# Patient Record
Sex: MALE | Race: WHITE | Employment: OTHER | URBAN - METROPOLITAN AREA
[De-identification: names, ages, dates, MRNs, and addresses within clinical notes are randomized per-mention and may not be internally consistent; named-entity substitution may affect disease eponyms.]

---

## 2018-08-15 ENCOUNTER — HOSPITAL ENCOUNTER (OUTPATIENT)
Age: 38
Discharge: HOME | End: 2018-08-15
Payer: COMMERCIAL

## 2018-08-15 LAB
ALBUMIN SERPL-MCNC: 4.4 G/DL (ref 3.5–5.2)
ALP BLD-CCNC: 63 U/L (ref 40–129)
ALT SERPL-CCNC: 13 U/L (ref 0–40)
ANION GAP SERPL CALCULATED.3IONS-SCNC: 9 MMOL/L (ref 7–16)
AST SERPL-CCNC: 14 U/L (ref 0–39)
BILIRUB SERPL-MCNC: 0.5 MG/DL (ref 0–1.2)
BUN BLDV-MCNC: 23 MG/DL (ref 6–20)
CALCIUM SERPL-MCNC: 9.3 MG/DL (ref 8.6–10.2)
CHLORIDE BLD-SCNC: 103 MMOL/L (ref 98–107)
CHOLESTEROL, FASTING: 185 MG/DL (ref 0–199)
CO2: 29 MMOL/L (ref 22–29)
CREAT SERPL-MCNC: 1 MG/DL (ref 0.7–1.2)
GFR AFRICAN AMERICAN: >60
GFR NON-AFRICAN AMERICAN: >60 ML/MIN/1.73
GLUCOSE FASTING: 92 MG/DL (ref 74–109)
HBA1C MFR BLD: 4.9 % (ref 4–5.6)
HCT VFR BLD CALC: 45.6 % (ref 37–54)
HDLC SERPL-MCNC: 57 MG/DL
HEMOGLOBIN: 15.4 G/DL (ref 12.5–16.5)
LDL CHOLESTEROL CALCULATED: 106 MG/DL (ref 0–99)
MCH RBC QN AUTO: 29.5 PG (ref 26–35)
MCHC RBC AUTO-ENTMCNC: 33.8 % (ref 32–34.5)
MCV RBC AUTO: 87.4 FL (ref 80–99.9)
PDW BLD-RTO: 12.2 FL (ref 11.5–15)
PLATELET # BLD: 138 E9/L (ref 130–450)
PMV BLD AUTO: 11 FL (ref 7–12)
POTASSIUM SERPL-SCNC: 5.2 MMOL/L (ref 3.5–5)
RBC # BLD: 5.22 E12/L (ref 3.8–5.8)
SODIUM BLD-SCNC: 141 MMOL/L (ref 132–146)
TOTAL PROTEIN: 6.9 G/DL (ref 6.4–8.3)
TRIGLYCERIDE, FASTING: 110 MG/DL (ref 0–149)
TSH SERPL DL<=0.05 MIU/L-ACNC: 1.64 UIU/ML (ref 0.27–4.2)
VITAMIN D 25-HYDROXY: 35 NG/ML (ref 30–100)
VLDLC SERPL CALC-MCNC: 22 MG/DL
WBC # BLD: 4.8 E9/L (ref 4.5–11.5)

## 2018-08-15 PROCEDURE — 83036 HEMOGLOBIN GLYCOSYLATED A1C: CPT

## 2018-08-15 PROCEDURE — 84443 ASSAY THYROID STIM HORMONE: CPT

## 2018-08-15 PROCEDURE — 80053 COMPREHEN METABOLIC PANEL: CPT

## 2018-08-15 PROCEDURE — 82306 VITAMIN D 25 HYDROXY: CPT

## 2018-08-15 PROCEDURE — 80061 LIPID PANEL: CPT

## 2018-08-15 PROCEDURE — 36415 COLL VENOUS BLD VENIPUNCTURE: CPT

## 2018-08-15 PROCEDURE — 85027 COMPLETE CBC AUTOMATED: CPT

## 2021-11-18 RX ORDER — SERTRALINE HYDROCHLORIDE 100 MG/1
150 TABLET, FILM COATED ORAL DAILY
COMMUNITY

## 2021-11-18 RX ORDER — MELOXICAM 15 MG/1
15 TABLET ORAL DAILY
COMMUNITY
End: 2022-01-03

## 2021-11-18 RX ORDER — BUPROPION HYDROCHLORIDE 200 MG/1
200 TABLET, EXTENDED RELEASE ORAL DAILY
COMMUNITY

## 2021-11-18 RX ORDER — GABAPENTIN 800 MG/1
800 TABLET ORAL 3 TIMES DAILY
COMMUNITY
End: 2022-01-03

## 2021-12-02 ENCOUNTER — TELEPHONE (OUTPATIENT)
Dept: PRIMARY CARE CLINIC | Age: 41
End: 2021-12-02

## 2021-12-02 NOTE — TELEPHONE ENCOUNTER
Called primary number for patient to cancel/ reschedule appt today due to Dr. Salma Guadarrama not being in the office. Patient's Dad answered and said he will have patient call back.

## 2021-12-07 ENCOUNTER — OFFICE VISIT (OUTPATIENT)
Dept: PRIMARY CARE CLINIC | Age: 41
End: 2021-12-07
Payer: COMMERCIAL

## 2021-12-07 VITALS
HEART RATE: 90 BPM | WEIGHT: 262.5 LBS | DIASTOLIC BLOOD PRESSURE: 88 MMHG | OXYGEN SATURATION: 97 % | HEIGHT: 75 IN | BODY MASS INDEX: 32.64 KG/M2 | TEMPERATURE: 97.3 F | SYSTOLIC BLOOD PRESSURE: 132 MMHG

## 2021-12-07 DIAGNOSIS — M76.52 PATELLAR TENDONITIS OF LEFT KNEE: ICD-10-CM

## 2021-12-07 DIAGNOSIS — M76.51 PATELLAR TENDONITIS OF RIGHT KNEE: Primary | ICD-10-CM

## 2021-12-07 DIAGNOSIS — E66.09 CLASS 1 OBESITY DUE TO EXCESS CALORIES WITHOUT SERIOUS COMORBIDITY WITH BODY MASS INDEX (BMI) OF 32.0 TO 32.9 IN ADULT: ICD-10-CM

## 2021-12-07 PROCEDURE — 99213 OFFICE O/P EST LOW 20 MIN: CPT | Performed by: FAMILY MEDICINE

## 2021-12-07 SDOH — ECONOMIC STABILITY: FOOD INSECURITY: WITHIN THE PAST 12 MONTHS, THE FOOD YOU BOUGHT JUST DIDN'T LAST AND YOU DIDN'T HAVE MONEY TO GET MORE.: NEVER TRUE

## 2021-12-07 SDOH — ECONOMIC STABILITY: FOOD INSECURITY: WITHIN THE PAST 12 MONTHS, YOU WORRIED THAT YOUR FOOD WOULD RUN OUT BEFORE YOU GOT MONEY TO BUY MORE.: NEVER TRUE

## 2021-12-07 ASSESSMENT — SOCIAL DETERMINANTS OF HEALTH (SDOH): HOW HARD IS IT FOR YOU TO PAY FOR THE VERY BASICS LIKE FOOD, HOUSING, MEDICAL CARE, AND HEATING?: SOMEWHAT HARD

## 2021-12-07 ASSESSMENT — PATIENT HEALTH QUESTIONNAIRE - PHQ9
1. LITTLE INTEREST OR PLEASURE IN DOING THINGS: 1
2. FEELING DOWN, DEPRESSED OR HOPELESS: 1
SUM OF ALL RESPONSES TO PHQ QUESTIONS 1-9: 2
SUM OF ALL RESPONSES TO PHQ9 QUESTIONS 1 & 2: 2
SUM OF ALL RESPONSES TO PHQ QUESTIONS 1-9: 2
SUM OF ALL RESPONSES TO PHQ QUESTIONS 1-9: 2

## 2021-12-07 NOTE — PROGRESS NOTES
Subjective:  37 y.o. male who presents to the office today with chief complaint:  Chief Complaint   Patient presents with    Knee Pain     Bilateral knee pain, states that the z pack did help when first started taking then pain came back by 75%. Bilateral knee pain: There has been no change in his knee pain. Discussion held with the patient regarding referral to orthopedics. Patient agrees. Health Maintenance Due   Topic Date Due    Hepatitis C screen  Never done    Varicella vaccine (1 of 2 - 2-dose childhood series) Never done    HIV screen  Never done    DTaP/Tdap/Td vaccine (2 - Tdap) 01/02/2000       Review of Systems    Review of Systems: All bolded are positive, all others are negative. General:  Fever, chills, diaphoresis, fatigue, malaise, night sweats, weight loss  Psychological:  Anxiety, disorientation, hallucinations. ENT:  Epistaxis, headaches, vertigo, visual changes. Cardiovascular:  Chest pain, irregular heartbeats, palpitations, paroxysmal nocturnal dyspnea. Respiratory:  Shortness of breath, coughing, sputum production, hemoptysis, wheezing, orthopnea. Gastrointestinal:  Nausea, vomiting, diarrhea, heartburn, constipation, abdominal pain, hematemesis, hematochezia, melena, acholic stools  Genito-Urinary:  Dysuria, urgency, frequency, hematuria  Musculoskeletal:  Joint pain, joint stiffness, joint swelling, muscle pain  Neurology:  Headache, focal neurological deficits, weakness, numbness, paresthesia  Derm:  Rashes, ulcers, excoriations, bruising  Extremities:  Decreased ROM, peripheral edema, mottling      Objective:  Vitals:    12/07/21 1504   BP: 132/88   Pulse: 90   Temp: 97.3 °F (36.3 °C)   SpO2: 97%     Physical Exam  Constitutional:       General: He is not in acute distress. Appearance: He is well-developed. He is not diaphoretic. HENT:      Head: Normocephalic and atraumatic.       Right Ear: External ear normal.      Left Ear: External ear normal.      Nose: Nose normal.      Mouth/Throat:      Pharynx: No oropharyngeal exudate. Eyes:      General:         Right eye: No discharge. Left eye: No discharge. Conjunctiva/sclera: Conjunctivae normal.      Pupils: Pupils are equal, round, and reactive to light. Cardiovascular:      Rate and Rhythm: Normal rate and regular rhythm. Heart sounds: Normal heart sounds. No murmur heard. No friction rub. No gallop. Pulmonary:      Effort: Pulmonary effort is normal. No respiratory distress. Breath sounds: Normal breath sounds. No wheezing or rales. Abdominal:      General: Bowel sounds are normal. There is no distension. Palpations: Abdomen is soft. Tenderness: There is no abdominal tenderness. There is no guarding or rebound. Musculoskeletal:      Cervical back: Normal range of motion and neck supple. Comments: Range of motion of bilateral knees is full. There is some lateral tracking noted of the patella. Tenderness to palpate anterior knee. Lymphadenopathy:      Cervical: No cervical adenopathy. Neurological:      Mental Status: He is alert and oriented to person, place, and time. Psychiatric:         Behavior: Behavior normal.         Thought Content:  Thought content normal.         Judgment: Judgment normal.           Results for orders placed or performed during the hospital encounter of 08/15/18   CBC   Result Value Ref Range    WBC 4.8 4.5 - 11.5 E9/L    RBC 5.22 3.80 - 5.80 E12/L    Hemoglobin 15.4 12.5 - 16.5 g/dL    Hematocrit 45.6 37.0 - 54.0 %    MCV 87.4 80.0 - 99.9 fL    MCH 29.5 26.0 - 35.0 pg    MCHC 33.8 32.0 - 34.5 %    RDW 12.2 11.5 - 15.0 fL    Platelets 573 111 - 047 E9/L    MPV 11.0 7.0 - 12.0 fL   Comprehensive Metabolic Panel, Fasting   Result Value Ref Range    Sodium 141 132 - 146 mmol/L    Potassium 5.2 (H) 3.5 - 5.0 mmol/L    Chloride 103 98 - 107 mmol/L    CO2 29 22 - 29 mmol/L    Anion Gap 9 7 - 16 mmol/L    Glucose, Fasting 92 74 - 109 mg/dL    BUN 23 (H) 6 - 20 mg/dL    CREATININE 1.0 0.7 - 1.2 mg/dL    GFR Non-African American >60 >=60 mL/min/1.73    GFR African American >60     Calcium 9.3 8.6 - 10.2 mg/dL    Total Protein 6.9 6.4 - 8.3 g/dL    Albumin 4.4 3.5 - 5.2 g/dL    Total Bilirubin 0.5 0.0 - 1.2 mg/dL    Alkaline Phosphatase 63 40 - 129 U/L    ALT 13 0 - 40 U/L    AST 14 0 - 39 U/L   Lipid, Fasting   Result Value Ref Range    Cholesterol, Fasting 185 0 - 199 mg/dL    Triglyceride, Fasting 110 0 - 149 mg/dL    HDL 57 >40 mg/dL    LDL Calculated 106 (H) 0 - 99 mg/dL    VLDL Cholesterol Calculated 22 mg/dL   TSH without Reflex   Result Value Ref Range    TSH 1.640 0.270 - 4.200 uIU/mL   Hemoglobin A1C   Result Value Ref Range    Hemoglobin A1C 4.9 4.0 - 5.6 %   Vitamin D 25 Hydrox, D2 & D3   Result Value Ref Range    Vit D, 25-Hydroxy 35 30 - 100 ng/mL         Assessment and Plan:  Suzy Santillan was seen today for knee pain. Diagnoses and all orders for this visit:    Patellar tendonitis of right knee  -     XR KNEE RIGHT (3 VIEWS); Future  -     Radha Mota 15, [facility]    Patellar tendonitis of left knee  -     XR KNEE RIGHT (3 VIEWS); Future  -     XR KNEE LEFT (3 VIEWS); Future  -     Radha Mota 15, [facility]    Class 1 obesity due to excess calories without serious comorbidity with body mass index (BMI) of 32.0 to 32.9 in adult  -     Comprehensive Metabolic Panel; Future  -     CBC; Future  -     TSH without Reflex; Future  -     Lipid Panel; Future        1. Patellar tendinitis: Referral sent to Dr. Nisreen Fuentes. X-rays were completed. Return in about 1 year (around 12/7/2022) for chronic disease management. Patient may come in sooner if needed for medical concerns. Patient advised to call at any time to cancel, re-schedule, or for any questions/concerns.             Fannie Camacho DO    12/7/21  9:51 AM

## 2021-12-10 ENCOUNTER — HOSPITAL ENCOUNTER (OUTPATIENT)
Dept: GENERAL RADIOLOGY | Age: 41
Discharge: HOME | End: 2021-12-12
Payer: COMMERCIAL

## 2021-12-10 ENCOUNTER — HOSPITAL ENCOUNTER (OUTPATIENT)
Age: 41
Discharge: HOME | End: 2021-12-12
Payer: COMMERCIAL

## 2021-12-10 ENCOUNTER — HOSPITAL ENCOUNTER (OUTPATIENT)
Age: 41
Discharge: HOME | End: 2021-12-10
Payer: COMMERCIAL

## 2021-12-10 DIAGNOSIS — M76.52 PATELLAR TENDONITIS OF LEFT KNEE: ICD-10-CM

## 2021-12-10 DIAGNOSIS — M76.51 PATELLAR TENDONITIS OF RIGHT KNEE: ICD-10-CM

## 2021-12-10 LAB
ALBUMIN SERPL-MCNC: 4.5 G/DL (ref 3.5–5.2)
ALP BLD-CCNC: 69 U/L (ref 40–129)
ALT SERPL-CCNC: 19 U/L (ref 0–40)
ANION GAP SERPL CALCULATED.3IONS-SCNC: 11 MMOL/L (ref 7–16)
AST SERPL-CCNC: 17 U/L (ref 0–39)
BILIRUB SERPL-MCNC: 0.4 MG/DL (ref 0–1.2)
BUN BLDV-MCNC: 13 MG/DL (ref 6–20)
CALCIUM SERPL-MCNC: 9 MG/DL (ref 8.6–10.2)
CHLORIDE BLD-SCNC: 104 MMOL/L (ref 98–107)
CHOLESTEROL, TOTAL: 186 MG/DL (ref 0–199)
CO2: 24 MMOL/L (ref 22–29)
CREAT SERPL-MCNC: 0.9 MG/DL (ref 0.7–1.2)
GFR AFRICAN AMERICAN: >60
GFR NON-AFRICAN AMERICAN: >60 ML/MIN/1.73
GLUCOSE BLD-MCNC: 88 MG/DL (ref 74–99)
HCT VFR BLD CALC: 45.4 % (ref 37–54)
HDLC SERPL-MCNC: 61 MG/DL
HEMOGLOBIN: 15.3 G/DL (ref 12.5–16.5)
LDL CHOLESTEROL CALCULATED: 110 MG/DL (ref 0–99)
MCH RBC QN AUTO: 29.7 PG (ref 26–35)
MCHC RBC AUTO-ENTMCNC: 33.7 % (ref 32–34.5)
MCV RBC AUTO: 88.2 FL (ref 80–99.9)
PDW BLD-RTO: 12.4 FL (ref 11.5–15)
PLATELET # BLD: 152 E9/L (ref 130–450)
PMV BLD AUTO: 10.8 FL (ref 7–12)
POTASSIUM SERPL-SCNC: 4.5 MMOL/L (ref 3.5–5)
RBC # BLD: 5.15 E12/L (ref 3.8–5.8)
SODIUM BLD-SCNC: 139 MMOL/L (ref 132–146)
TOTAL PROTEIN: 6.7 G/DL (ref 6.4–8.3)
TRIGL SERPL-MCNC: 76 MG/DL (ref 0–149)
TSH SERPL DL<=0.05 MIU/L-ACNC: 1.4 UIU/ML (ref 0.27–4.2)
VLDLC SERPL CALC-MCNC: 15 MG/DL
WBC # BLD: 4.3 E9/L (ref 4.5–11.5)

## 2021-12-10 PROCEDURE — 84443 ASSAY THYROID STIM HORMONE: CPT

## 2021-12-10 PROCEDURE — 80053 COMPREHEN METABOLIC PANEL: CPT

## 2021-12-10 PROCEDURE — 73562 X-RAY EXAM OF KNEE 3: CPT

## 2021-12-10 PROCEDURE — 80061 LIPID PANEL: CPT

## 2021-12-10 PROCEDURE — 36415 COLL VENOUS BLD VENIPUNCTURE: CPT

## 2021-12-10 PROCEDURE — 85027 COMPLETE CBC AUTOMATED: CPT

## 2022-01-03 ENCOUNTER — OFFICE VISIT (OUTPATIENT)
Dept: ORTHOPEDIC SURGERY | Age: 42
End: 2022-01-03
Payer: COMMERCIAL

## 2022-01-03 VITALS — TEMPERATURE: 98 F | HEIGHT: 75 IN | BODY MASS INDEX: 31.71 KG/M2 | WEIGHT: 255 LBS

## 2022-01-03 DIAGNOSIS — M17.11 PRIMARY OSTEOARTHRITIS OF RIGHT KNEE: Primary | ICD-10-CM

## 2022-01-03 DIAGNOSIS — M17.12 PRIMARY OSTEOARTHRITIS OF LEFT KNEE: ICD-10-CM

## 2022-01-03 PROCEDURE — 99204 OFFICE O/P NEW MOD 45 MIN: CPT | Performed by: ORTHOPAEDIC SURGERY

## 2022-01-03 RX ORDER — GABAPENTIN 400 MG/1
CAPSULE ORAL
COMMUNITY
Start: 2021-12-21

## 2022-01-03 RX ORDER — CELECOXIB 200 MG/1
200 CAPSULE ORAL DAILY
Qty: 30 CAPSULE | Refills: 3 | Status: SHIPPED
Start: 2022-01-03 | End: 2022-05-24 | Stop reason: SDUPTHER

## 2022-01-03 RX ORDER — METHYLPREDNISOLONE 4 MG/1
4 TABLET ORAL SEE ADMIN INSTRUCTIONS
Qty: 1 KIT | Refills: 0 | Status: SHIPPED | OUTPATIENT
Start: 2022-01-03 | End: 2022-01-09

## 2022-01-03 NOTE — PROGRESS NOTES
Chief Complaint   Patient presents with    Knee Pain     b/l knee osteoarthritis, pain started 7/2021       Subjective:     Patient ID: Omayra Blake is a 37 y.o..  male    Knee Pain  Patient complains of bilateral knee pain R=L. This is evaluated as a personal injury. There was not a history of injury. He was at amusement park walking around for extended hours   The pain began 6 months ago. The pain is located anterior. He describes  His symptoms as sharp. He has not experienced popping, clicking, locking, and giving way in the affected knee. The patient has had pain with kneeling, squating, and climbing stairs. Symptoms improve with rest. The symptoms are worse with activity, stair climbing. The knee has not given out or felt unstable. The patient can bend and straighten the knee fully. The patient is active in none. Treatment to date has been ice, heat, NSAID's, knee sleeve/brace, without significant relief.      Past Medical History:   Diagnosis Date    Bipolar disorder ([facility] [address]     Colon polyp     Epidermoid cyst     IBS (irritable bowel syndrome)     IBS (irritable bowel syndrome)     Migraine     Migraine     OCD (obsessive compulsive disorder)      Past Surgical History:   Procedure Laterality Date    HERNIA REPAIR Right     X3    KNEE SURGERY Right     Excision Mass Right Knee     TONSILLECTOMY         Current Outpatient Medications:     gabapentin (NEURONTIN) 400 MG capsule, TAKE ONE CAPSULE BY MOUTH FOUR TIMES A DAY, Disp: , Rfl:     sertraline (ZOLOFT) 100 MG tablet, Take 150 mg by mouth daily Take 2 tablets daily, Disp: , Rfl:     buPROPion (WELLBUTRIN SR) 200 MG extended release tablet, Take 200 mg by mouth daily, Disp: , Rfl:   No Known Allergies  Social History     Socioeconomic History    Marital status: Single     Spouse name: Not on file    Number of children: Not on file    Years of education: Not on file    Highest education level: Not on file   Occupational History  Not on file   Tobacco Use    Smoking status: Never Smoker    Smokeless tobacco: Never Used   Substance and Sexual Activity    Alcohol use: Never    Drug use: Never    Sexual activity: Not on file   Other Topics Concern    Not on file   Social History Narrative    Not on file     Social Determinants of Health     Financial Resource Strain: Medium Risk    Difficulty of Paying Living Expenses: Somewhat hard   Food Insecurity: No Food Insecurity    Worried About Running Out of Food in the Last Year: Never true    Debra of Food in the Last Year: Never true   Transportation Needs:     Lack of Transportation (Medical): Not on file    Lack of Transportation (Non-Medical): Not on file   Physical Activity:     Days of Exercise per Week: Not on file    Minutes of Exercise per Session: Not on file   Stress:     Feeling of Stress : Not on file   Social Connections:     Frequency of Communication with Friends and Family: Not on file    Frequency of Social Gatherings with Friends and Family: Not on file    Attends [employer] Services: Not on file    Active Member of [address] [place] [address] [employer] or Organizations: Not on file    Attends Club or Organization Meetings: Not on file    Marital Status: Not on file   Intimate Partner Violence:     Fear of Current or Ex-Partner: Not on file    Emotionally Abused: Not on file    Physically Abused: Not on file    Sexually Abused: Not on file   Housing Stability:     Unable to Pay for Housing in the Last Year: Not on file    Number of [place] in the Last Year: Not on file    Unstable Housing in the Last Year: Not on file     Family History   Problem Relation Age of Onset    High Blood Pressure Mother     Asthma Mother     Asthma Brother          REVIEW OF SYSTEMS:     General/Constitution:  (-)weight loss, (-)fever, (-)chills, (-)weakness. Skin: (-) rash,(-) psoriasis,(-) eczema, (-)skin cancer.    Musculoskeletal: (-) fractures,  (-) dislocations,(-) collagen vascular disease, (-) fibromyalgia, (-) multiple sclerosis, (-) muscular dystrophy, (-) RSD,(-) joint pain (-)swelling, (-) joint pain,swelling. Neurologic: (-) epilepsy, (-)seizures,(-) brain tumor,(-) TIA, (-)stroke, (-)headaches, (-)Parkinson disease,(-) memory loss, (-) LOC. Cardiovascular: (-) Chest pain, (-) swelling in legs/feet, (-) SOB, (-) cramping in legs/feet with walking. Respiratory: (-) SOB, (-) Coughing, (-) night sweats. GI: (-) nausea, (-) vomiting, (-) diarrhea, (-) blood in stool, (-) gastric ulcer. Psychiatric: (-) Depression, (-) Anxiety, (-) bipolar disease, (-) Alzheimer's Disease  Allergic/Immunologic: (-) allergies latex, (-) allergies metal, (-) skin sensitivity. Hematlogic: (-) anemia, (-) blood transfusion, (-) DVT/PE, (-) Clotting disorders    Subjective:    Constitution:  Temp 98 °F (36.7 °C)   Ht 6' 3\" (1.905 m)   Wt 255 lb (115.7 kg)   BMI 31.87 kg/m²     Psycihatric:  The patient is alert and oriented x 3, appears to be stated age and in no distress. Respiratory:  Respiratory effort is not labored. Patient is not gasping. Palpation of the chest reveals no tactile fremitus. Skin:  Upon inspection: the skin appears warm, dry and intact. There is  a previous scar over the affected area. There is any cellulitis, lymphedema or cutaneous lesions noted in the lower extremities. Upon palpation there is no induration noted. Neurologic:  Gait: antalgic; Motor exam of the lower extremities show ; quadriceps, hamstrings, foot dorsi and plantar flexors intact R.  5/5 and L. 5/5. Deep tendon reflexes are 2/4 at the knees and 2/4 at the ankles with strong extensor hallicus longus motor strength bilaterally. Sensory to both feet is intact to all sensory roots, . Cardiovascular: The vascular exam is normal and is well perfused to distal extremities. Distal pulses DP/PT: R. 2+; L. 2+. There is cap refill noted less than two seconds in all digits.  There is not edema of the bilateral lower extremities. There is not varicosities noted in the distal extremities. Lymph:  Upon palpation,  there is no lymphadenopathy noted in bilateral lower extremities. Musculoskeletal:  Gait: antalgic; examination of the nails and digits reveal no cyanosis or clubbing. Lumbar exam:  On visual inspection, there is not deformity of the spine. full range of motion, no tenderness, palpable spasm or pain on motion. Special tests: Straight Leg Raise negative, Elizabeth test negative. Hip exam:   Upon inspection, there is not deformity noted. Upon palpation there is not tenderness. ROM: is  full and symmetrical.   Strength: Hip Flexors 5/5; Hip Abductors 5/5; Hip Adduction 5/5. Knee exam:  Bilateral knee exam shows;  range of motion of R. Knee is 0 to 120, and L. Knee is 0 to 120. The patient does have  pain on motion, effusion is none, there is tenderness over the  anterior region, there are not any masses, there is not ligamentous instability, there is not  deformity noted. Knee exam: neither positive for moderate crepitations, some mild tenderness laxity is not noted with  stress.   There is not a popliteal cyst.    R. Knee:  Lachman's negative, Anterior Drawer negative, Posterior Drawer negative  Wale's negative, Thallasy  negative,   PF grind test negative, Apprehension test negative, Patellar J sign  negative  L. Knee:  Lachman's negative, Anterior Drawer negative, Posterior Drawer negative  Wale's negative, Thallasy  negative,   PF grind test negative, Apprehension test negative,  Patellar J sign  negative    Xray Exam:  Radiographs of the left knee demonstrate no fractures with preserved   alignment.  Minimal medial compartment joint space narrowing and   tricompartmental degenerative spurring.  Osseous mineralization is normal.   There is no large joint effusion.       Radiographs of the right knee demonstrate no fractures with preserved   alignment.  Minimal medial compartment joint space narrowing and   tricompartmental degenerative spurring.  Osseous mineralization is normal.   There is no large joint effusion. Radiographic findings reviewed with patient    Assessment:  Encounter Diagnoses   Name Primary?  Primary osteoarthritis of right knee Yes    Primary osteoarthritis of left knee        Plan:  Natural history and expected course discussed. Questions answered. Educational materials distributed. Rest, ice, compression, and elevation (RICE) therapy. Reduction in offending activity. I had a lengthy discussion with the patient regarding their diagnosis. I explained treatment options including surgical vs non surgical treatment. I reviewed in detail the risks and benefits and outlined the procedure in detail with expected outcomes and possible complications. I also discussed non surgical treatment such as injections (CSI and visco supplementation), physical therapy, topical creams and NSAID's. They have elected for conservative management at this time.    Medrol  celebrex

## 2022-05-24 ENCOUNTER — TELEPHONE (OUTPATIENT)
Dept: ORTHOPEDIC SURGERY | Age: 42
End: 2022-05-24

## 2022-05-24 RX ORDER — CELECOXIB 200 MG/1
200 CAPSULE ORAL DAILY
Qty: 90 CAPSULE | Refills: 0 | Status: SHIPPED
Start: 2022-05-24 | End: 2022-08-26 | Stop reason: SDUPTHER

## 2022-05-24 NOTE — TELEPHONE ENCOUNTER
Patient is requesting a 90 day supply    . Last appointment 1/3/2022  Next appointment No future appointments.    Last refill:  01/03/2022  DOS: Last seen 01/03/2022      Patient called in requesting refill of:    celecoxib (CELEBREX) 200 MG capsule         [id number] - [place], [place] - [address] [facility] [phone] - F [address] [place] [address], [place] [address]

## 2022-08-26 ENCOUNTER — TELEPHONE (OUTPATIENT)
Dept: ORTHOPEDIC SURGERY | Age: 42
End: 2022-08-26

## 2022-08-26 RX ORDER — CELECOXIB 200 MG/1
200 CAPSULE ORAL DAILY
Qty: 90 CAPSULE | Refills: 0 | Status: SHIPPED | OUTPATIENT
Start: 2022-08-26 | End: 2022-11-24

## 2022-08-26 NOTE — TELEPHONE ENCOUNTER
.Last appointment 1/3/2022  Next appointment No future appointments.    Last refill:  05/24/2022  DOS:  last seen 01/03/2022      Patient called in requesting refill of:    celecoxib (CELEBREX) 200 MG capsule       [id number] - [place], [place] - [address] [facility] [phone] - F [address] [place] [address], [place] [address]

## 2022-12-12 ENCOUNTER — TELEPHONE (OUTPATIENT)
Dept: ORTHOPEDIC SURGERY | Age: 42
End: 2022-12-12

## 2022-12-12 RX ORDER — CELECOXIB 200 MG/1
200 CAPSULE ORAL DAILY
Qty: 90 CAPSULE | Refills: 0 | Status: SHIPPED | OUTPATIENT
Start: 2022-12-12 | End: 2023-03-12

## 2022-12-12 NOTE — TELEPHONE ENCOUNTER
.Last appointment 1/3/2022  Next appointment No future appointments.    Last refill:  08/26/2022  DOS:       Patient called in requesting refill of:    celecoxib (CELEBREX) 200 MG capsule      [id number] - [place] , [place] - [address] [facility] [phone] - F [address] [address], [place]  [place] [address]

## 2023-02-14 ENCOUNTER — HOSPITAL ENCOUNTER (EMERGENCY)
Age: 43
Discharge: HOME | End: 2023-02-14
Attending: EMERGENCY MEDICINE
Payer: COMMERCIAL

## 2023-02-14 VITALS
RESPIRATION RATE: 17 BRPM | OXYGEN SATURATION: 95 % | TEMPERATURE: 98 F | DIASTOLIC BLOOD PRESSURE: 91 MMHG | HEIGHT: 75 IN | WEIGHT: 229 LBS | HEART RATE: 79 BPM | BODY MASS INDEX: 28.47 KG/M2 | SYSTOLIC BLOOD PRESSURE: 141 MMHG

## 2023-02-14 DIAGNOSIS — S00.81XA ABRASION OF FACE, INITIAL ENCOUNTER: ICD-10-CM
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
## 2023-02-14 PROCEDURE — 90471 IMMUNIZATION ADMIN: CPT

## 2023-02-14 PROCEDURE — 99284 EMERGENCY DEPT VISIT MOD MDM: CPT

## 2023-02-14 PROCEDURE — 90715 TDAP VACCINE 7 YRS/> IM: CPT

## 2023-02-14 PROCEDURE — 6360000002 HC RX W HCPCS

## 2023-02-14 RX ADMIN — TETANUS TOXOID, REDUCED DIPHTHERIA TOXOID AND ACELLULAR PERTUSSIS VACCINE, ADSORBED 0.5 ML: 5; 2.5; 8; 8; 2.5 SUSPENSION INTRAMUSCULAR at 21:55

## 2023-02-14 ASSESSMENT — ENCOUNTER SYMPTOMS
ABDOMINAL DISTENTION: 0
EYE REDNESS: 0
BLOOD IN STOOL: 0
WHEEZING: 0
SORE THROAT: 0
DIARRHEA: 0
NAUSEA: 0
BACK PAIN: 0
CONSTIPATION: 0
SINUS PRESSURE: 0
VOMITING: 0
ABDOMINAL PAIN: 0
RHINORRHEA: 0
PHOTOPHOBIA: 0
COUGH: 0
SHORTNESS OF BREATH: 0
EYE DISCHARGE: 0

## 2023-02-14 ASSESSMENT — PAIN SCALES - GENERAL: PAINLEVEL_OUTOF10: 6

## 2023-02-14 ASSESSMENT — PAIN DESCRIPTION - LOCATION: LOCATION: NOSE

## 2023-02-14 ASSESSMENT — PAIN - FUNCTIONAL ASSESSMENT: PAIN_FUNCTIONAL_ASSESSMENT: 0-10

## 2023-02-15 NOTE — DISCHARGE INSTRUCTIONS
Please continue taking your home medications as they are prescribed. We recommend that you follow-up with your primary care physician to follow-up on your symptoms and to discuss your recent emergency room visit. Please return to the emergency room if you experience worsening of her symptoms, severe headaches, visual changes, hearing changes, numbness, weakness, tingling, loss of sensation, severe chest pain, severe shortness of breath, or uncontrolled fevers.

## 2023-02-15 NOTE — ED PROVIDER NOTES
[address] [facility]  Department of Emergency Medicine     Written by: Deepika Simpson MD  Patient Name: Lawson Ahuja  Attending Provider: Rajendra Marino DO  Admit Date: 2023  9:15 PM  MRN: [id number]                   : 1980      Chief Complaint   Patient presents with    Fall    Facial Injury     Lac to nose after falling on metal  knick knack/ denies loc     - Chief complaint    HPI   Patient is a 52-year-old male with a past medical history of bipolar disorder, OCD, and irritable bowel syndrome presenting after a fall and facial injury. Patient tripped on a sandal approximately 40 minutes prior to arrival and fell onto some knick knacks onto the a table. Patient states that there are items made of wood, porcelain, and metal.  Patient did not lose consciousness and he is not on any blood thinners. His mother was in the next room. Patient denies smoking history, alcohol use, or drug use. Patient denies headache, lightheadedness, dizziness, numbness, weakness, tingling, loss of sensation, loss of consciousness, fever, cough, sore throat, chest pain, shortness of breath, nausea, vomiting, abdominal pain, hematuria, dysuria, increasing or decreasing urinary frequency, blood in stool, constipation, diarrhea, leg pain, leg swelling, recent travel, recent illness, recent surgery, or sick contacts. Review of Systems   Constitutional:  Negative for activity change, chills, fatigue and fever. HENT:  Negative for congestion, postnasal drip, rhinorrhea, sinus pressure and sore throat. Eyes:  Negative for photophobia, discharge, redness and visual disturbance. Respiratory:  Negative for cough, shortness of breath and wheezing. Cardiovascular:  Negative for chest pain, palpitations and leg swelling. Gastrointestinal:  Negative for abdominal distention, abdominal pain, blood in stool, constipation, diarrhea, nausea and vomiting.    Endocrine: Negative for cold intolerance and heat intolerance. Genitourinary:  Negative for decreased urine volume, difficulty urinating, dysuria, flank pain, frequency, hematuria and urgency. Musculoskeletal:  Negative for arthralgias, back pain, joint swelling and myalgias. Fall, facial injury   Skin:  Negative for rash and wound. Allergic/Immunologic: Negative for immunocompromised state. Neurological:  Negative for dizziness, syncope, facial asymmetry, weakness, light-headedness, numbness and headaches. Hematological:  Does not bruise/bleed easily. Psychiatric/Behavioral:  Negative for behavioral problems and hallucinations. Physical Exam  Constitutional:       General: He is not in acute distress. Appearance: Normal appearance. He is not ill-appearing, toxic-appearing or diaphoretic. HENT:      Head: Normocephalic and atraumatic. No raccoon eyes, Chong's sign, abrasion, contusion, masses, right periorbital erythema, left periorbital erythema or laceration. Hair is normal.      Right Ear: Hearing normal.      Left Ear: Hearing normal.      Nose: Laceration present. No nasal deformity, septal deviation, signs of injury, nasal tenderness, mucosal edema, congestion or rhinorrhea. Right Nostril: No foreign body, epistaxis, septal hematoma or occlusion. Left Nostril: No foreign body, epistaxis, septal hematoma or occlusion. Right Turbinates: Not enlarged, swollen or pale. Left Turbinates: Not enlarged, swollen or pale. Comments: Patient has a 3 cm linear scratch on the left side of his nose that is bleeding mildly. Patient also has a 1 cm laceration underneath his right nares at the lip line that is holding itself close and not actively bleeding. There is dried blood at the nares     Mouth/Throat:      Lips: Pink. No lesions. Mouth: Mucous membranes are moist. No injury, lacerations, oral lesions or angioedema. Tongue: No lesions. Tongue does not deviate from midline.       Palate: No mass and lesions. Pharynx: Oropharynx is clear. Uvula midline. No pharyngeal swelling, oropharyngeal exudate, posterior oropharyngeal erythema or uvula swelling. Eyes:      Extraocular Movements: Extraocular movements intact. Conjunctiva/sclera: Conjunctivae normal.      Pupils: Pupils are equal, round, and reactive to light. Cardiovascular:      Rate and Rhythm: Normal rate and regular rhythm. Pulses: Normal pulses. Radial pulses are 2+ on the right side and 2+ on the left side. Posterior tibial pulses are 2+ on the right side and 2+ on the left side. Heart sounds: S1 normal and S2 normal.   Pulmonary:      Effort: Pulmonary effort is normal. No tachypnea, accessory muscle usage or respiratory distress. Breath sounds: Normal breath sounds and air entry. No decreased breath sounds, wheezing, rhonchi or rales. Abdominal:      General: Abdomen is flat. Bowel sounds are normal. There is no distension. Palpations: Abdomen is soft. There is no fluid wave or mass. Tenderness: There is no abdominal tenderness. There is no right CVA tenderness, left CVA tenderness or guarding. Musculoskeletal:         General: No swelling or tenderness. Normal range of motion. Cervical back: Normal range of motion and neck supple. No rigidity. Right lower leg: No edema. Left lower leg: No edema. Lymphadenopathy:      Cervical: No cervical adenopathy. Skin:     General: Skin is warm and dry. Capillary Refill: Capillary refill takes less than 2 seconds. Findings: No bruising, lesion or rash. Neurological:      General: No focal deficit present. Mental Status: He is alert and oriented to person, place, and time. Mental status is at baseline. Motor: No weakness. Psychiatric:         Attention and Perception: Attention normal.         Mood and Affect: Mood and affect normal.         Behavior: Behavior is cooperative.         Procedures       MDM  Number of Diagnoses or Management Options  Abrasion of face, initial encounter  Fall, initial encounter  Diagnosis management comments: Patient is a 71-year-old male with a past medical history of bipolar disorder, OCD, and irritable bowel syndrome presenting after a fall and facial injury. At the time of initial evaluation, the patient is hypertensive but otherwise hemodynamically stable. Differential diagnosis includes facial abrasion and laceration. Patient's last tetanus booster was greater than 10 years ago. We will administer a new tetanus booster. Patient's injuries do not require repair in the emergency room. Patient is comfortable with outpatient follow-up. At the time of discharge, the patient demonstrated good understanding of his condition, had no questions, and was hemodynamically stable. ED Course as of 02/14/23 2149 Tue Feb 14, 2023 2145 ATTENDING PROVIDER ATTESTATION:     I have personally performed and/or participated in the history, exam, medical decision making, and procedures and agree with all pertinent clinical information unless otherwise noted. I have also reviewed and agree with the past medical, family and social history unless otherwise noted. I have discussed this patient in detail with the resident, and provided the instruction and education regarding . Patient here for a nasal injury. He states that he tripped and fell prior to ER arrival and hit his nose on some little items on a countertop. No blunt injury and no head injury otherwise. No loss of consciousness. No facial pain. Has 2 cuts to his nose on the left and 1 on the right. Bleeding is controlled. No other complaints, no anticoagulants. Unknown last tetanus shot. No eye injury and no other injuries  My findings/plan: Patient is sitting up in the bed resting comfortably no distress.   Heart rate regular, lungs are clear and equal.  Arms and legs are neurovascular intact with no signs of acute bone or joint injury. He has no cervical, thoracic lumbar spine tenderness. He has a superficial linear abrasion to the left nares. He has a laceration under the right ala at the lip edge holding itself closed with no bleeding. There were no septal hematomas and no intranasal injuries. No intraoral injuries. No other sign of acute head or face injury. No hyphemas bilaterally, pupils are equal, round and reactive to light. No periorbital injuries. [NC]      ED Course User Index  [NC] Jadyn Dunnebianka Pettit DO       --------------------------------------------- PAST HISTORY ---------------------------------------------  Past Medical History:  has a past medical history of Bipolar disorder ([facility] [address] Colon polyp, Epidermoid cyst, IBS (irritable bowel syndrome), IBS (irritable bowel syndrome), Migraine, Migraine, and OCD (obsessive compulsive disorder). Past Surgical History:  has a past surgical history that includes hernia repair (Right); Tonsillectomy; and knee surgery (Right). Social History:  reports that he has never smoked. He has never used smokeless tobacco. He reports that he does not drink alcohol and does not use drugs. Family History: family history includes Asthma in his brother and mother; High Blood Pressure in his mother. The patients home medications have been reviewed. Allergies: Patient has no known allergies. -------------------------------------------------- RESULTS -------------------------------------------------  Labs:  No results found for this visit on 02/14/23. Radiology:  No orders to display       ------------------------- NURSING NOTES AND VITALS REVIEWED ---------------------------  Date / Time Roomed:  2/14/2023  9:15 PM  ED Bed Assignment:  21/21    The nursing notes within the ED encounter and vital signs as below have been reviewed.    BP (!) 141/91   Pulse 79   Temp 98 °F (36.7 °C)   Resp 17   Ht 6' 3\" (1.905 m)   Wt 229 lb (103.9 kg)   SpO2 95%   BMI 28.62 kg/m²   Oxygen Saturation Interpretation: Normal      ------------------------------------------ PROGRESS NOTES ------------------------------------------  I have spoken with the patient and discussed todays results, in addition to providing specific details for the plan of care and counseling regarding the diagnosis and prognosis. Their questions are answered at this time and they are agreeable with the plan. I discussed at length with them reasons for immediate return here for re evaluation. They will followup with primary care by calling their office tomorrow. --------------------------------- ADDITIONAL PROVIDER NOTES ---------------------------------  At this time the patient is without objective evidence of an acute process requiring hospitalization or inpatient management. They have remained hemodynamically stable throughout their entire ED visit and are stable for discharge with outpatient follow-up. The plan has been discussed in detail and they are aware of the specific conditions for emergent return, as well as the importance of follow-up. New Prescriptions    No medications on file       Diagnosis:  1. Fall, initial encounter    2. Abrasion of face, initial encounter        Disposition:  Patient's disposition: Discharge to home  Patient's condition is stable. Patient was seen and evaluated by myself and my attending Elizabeth Ibarra DO. Assessment and Plan discussed with attending provider, please see attestation for final plan of care.      MD Yuriy Buchanan MD  Resident  02/14/23 [id number]

## 2023-04-19 ENCOUNTER — EVALUATION (OUTPATIENT)
Dept: PHYSICAL THERAPY | Age: 43
End: 2023-04-19

## 2023-04-19 DIAGNOSIS — M17.0 PRIMARY OSTEOARTHRITIS OF BOTH KNEES: Primary | ICD-10-CM

## 2023-04-19 NOTE — PROGRESS NOTES
Physical Therapy Daily Treatment Note    Date: 2023  Patient Name: Gideon Waldrop  : 1980   MRN: [id number]  [facility]:  insidious   DOSx: -  Referring Provider: Johnson Chandler PA-C  [address] [place], [place] [address]Abad Flores     Medical Diagnosis:     M17.0 (ICD-10-CM) - Bilateral primary osteoarthritis of knee      X = TO BE PERFORMED NEXT VISIT  > = PROGRESS TO THIS    S: See eval  O: Discussed anatomy, physiology, body mechanics, principles of loading, and progressive loading/activity. Time 8021-8305     Visit 1 Repeat outcome measure at mid point and end. Pain Pain at rest 0/10     ROM      Modalities         MO   Manual            Stretch      Towel / strap DF, INV, EV   TE      TE   Exercise      Ball / can rolling   TE   Toe curls      Heel slides   TE   QS   TE   SLR X(w/ 3 sec hold)  TE   SAQ   TE   [x] TG  [] Leg Press 2-leg x  TE   [] TG  [] Leg Press 1-leg   TE   Hamstring Curl Machine   TE   Knee Extension Machine   TE   Step-ups - FWD   TA   Step-ups - LAT x  TA   Step-ups - BWD   TA   Heel touches x  TA      TA      TA               A:  Tolerated well.     P: Continue with rehab plan  Natalya Moy PT    Treatment Charges: Mins Units   Initial Evaluation 45 1   Re-Evaluation     Ther Exercise         TE     Manual Therapy     MT     Ther Activities        TA     Gait Training          GT     Neuro Re-education NR     Modalities     Non-Billable Service Time     Other     Total Time/Units 45 1

## 2023-04-19 NOTE — PROGRESS NOTES
[address] [facility] OUTPATIENT REHABILITATION  PHYSICAL THERAPY INITIAL EVALUATION         Date:  2023   Patient: Darlin Dietz  : 1980  MRN: [id number]  Referring Provider: Juan Lopez PA-C  [address] [place], [place] [address]Daylin April Jayesh Meder[address]elina [address]     Medical Diagnosis:     M17.0 (ICD-10-CM) - Bilateral primary osteoarthritis of knee    Physician Order: Eval and Treat      SUBJECTIVE:     Onset date:     Onset: Insidious      Interventions for current problem:  celebryx    Chief complaint: pain walking dog or coming down stairs    Behavior: condition is staying the same    Pain:   Current: denies/10     Best: denies/10     Worst:5/10   Pain frequency: intermittent    Symptom Type / Quality: aching  Location[de-identified] Knee: anterior    Aggravated by: walking, standing, pivoting    Relieved by: rest, reduced weightbearing    Imaging results: No results found. Past Medical History  Past Medical History:   Diagnosis Date    Bipolar disorder (HCC)     Colon polyp     Epidermoid cyst     IBS (irritable bowel syndrome)     IBS (irritable bowel syndrome)     Migraine     Migraine     OCD (obsessive compulsive disorder)      Past Surgical History:   Procedure Laterality Date    HERNIA REPAIR Right     X3    KNEE SURGERY Right     Excision Mass Right Knee     TONSILLECTOMY         Medications:   Current Outpatient Medications   Medication Sig Dispense Refill    celecoxib (CELEBREX) 200 MG capsule Take 1 capsule by mouth daily 30 capsule 3    gabapentin (NEURONTIN) 400 MG capsule TAKE ONE CAPSULE BY MOUTH FOUR TIMES A DAY      sertraline (ZOLOFT) 100 MG tablet Take 1 tablet by mouth daily Take 2 tablets daily      buPROPion (WELLBUTRIN SR) 200 MG extended release tablet Take 1 tablet by mouth daily       No current facility-administered medications for this visit. 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 Status: part time.     Exercise regimen: none    Hobbies:

## 2023-04-20 PROBLEM — M17.0 PRIMARY OSTEOARTHRITIS OF BOTH KNEES: Status: ACTIVE | Noted: 2023-04-20

## 2023-04-24 ENCOUNTER — TREATMENT (OUTPATIENT)
Dept: PHYSICAL THERAPY | Age: 43
End: 2023-04-24
Payer: COMMERCIAL

## 2023-04-24 DIAGNOSIS — M79.671 RIGHT FOOT PAIN: Primary | ICD-10-CM

## 2023-04-24 DIAGNOSIS — M17.0 PRIMARY OSTEOARTHRITIS OF BOTH KNEES: Primary | ICD-10-CM

## 2023-04-24 PROCEDURE — 97530 THERAPEUTIC ACTIVITIES: CPT

## 2023-04-24 PROCEDURE — 97110 THERAPEUTIC EXERCISES: CPT

## 2023-04-24 NOTE — PROGRESS NOTES
Physical Therapy Daily Treatment Note    Date: 2023  Patient Name: Joshua Salgado  : 1980   MRN: [id number]  [facility]:  insidious   DOSx: -    Referring Provider:   Lev Almanza PA-C  [address] [facility] Center[facility]lin BadilloAmber GeoffAbad [address]arg[address]       Medical Diagnosis:   M17.0 (ICD-10-CM) - Bilateral primary osteoarthritis of knee    Chip Fernandez has anterior knee pain related to his extensor mechanism as evidenced by his pain resisting his dog pulling, descending stairs, and squatting. We will start a loading program of SLR progressing to knee extension exercises. X = TO BE PERFORMED NEXT VISIT  > = PROGRESS TO THIS    S: Patient reports he has the most difficulty with anything that causes \"strong impact\". O: Discussed anatomy, physiology, body mechanics, principles of loading, and progressive loading/activity. Time 6015-8037     Visit 2 Repeat outcome measure at mid point and end. Pain Pain at rest 0/10     ROM      Modalities         MO   Manual            Stretch      Towel / strap DF, INV, EV   TE      TE   Exercise      Nustep L5 x 10 min   TA   Ball / can rolling   TE   Toe curls      Heel slides   TE   QS   TE   SLR 3 x 10, 3 sec hold each  TE   SAQ   TE   LAQ 3 x 10, 3-5 sec hold each     [x] TG  [] Leg Press 2-leg x  TE   [] TG  [] Leg Press 1-leg   TE   Hamstring Curl Machine   TE   Knee Extension Machine   TE   Step-ups - FWD   TA   Step-ups - LAT x  TA   Step-ups - BWD   TA   Heel touches x  TA      TA      TA               A:Tolerated well. Large, functional, dynamic, global movements used to build strength, balance, endurance, and flexibility and to improve physical performance. Provided HEP.   P: Continue with rehab plan  Jillian Expose, PTA    Treatment Charges: Mins Units   Initial Evaluation     Re-Evaluation     Ther Exercise         TE 15 1   Manual Therapy     MT     Ther Activities        TA 15 1   Gait Training          GT     Neuro Re-education NR     Modalities     Non-Billable Service

## 2023-04-25 ENCOUNTER — OFFICE VISIT (OUTPATIENT)
Dept: PODIATRY | Age: 43
End: 2023-04-25
Payer: COMMERCIAL

## 2023-04-25 VITALS — HEIGHT: 75 IN | BODY MASS INDEX: 29.34 KG/M2 | WEIGHT: 236 LBS

## 2023-04-25 DIAGNOSIS — Q66.6 PES VALGUS: Primary | ICD-10-CM

## 2023-04-25 DIAGNOSIS — M77.41 METATARSALGIA OF BOTH FEET: ICD-10-CM

## 2023-04-25 DIAGNOSIS — M77.42 METATARSALGIA OF BOTH FEET: ICD-10-CM

## 2023-04-25 DIAGNOSIS — R26.2 DIFFICULTY WALKING: ICD-10-CM

## 2023-04-25 DIAGNOSIS — L84 CORNS AND CALLUS: ICD-10-CM

## 2023-04-25 PROCEDURE — 99243 OFF/OP CNSLTJ NEW/EST LOW 30: CPT | Performed by: PODIATRIST

## 2023-04-25 RX ORDER — AMMONIUM LACTATE 12 G/100G
CREAM TOPICAL
Qty: 385 G | Refills: 4 | Status: SHIPPED | OUTPATIENT
Start: 2023-04-25

## 2023-04-25 NOTE — PROGRESS NOTES
Dorcas Holter is here today as a new patient for an evaluation of bilateral feet. Patient states he has painful callouses to both feet. He reports having bad arches as well. PCP is Dr. Ana Bean, last seen 04/11/2023.

## 2023-04-25 NOTE — PROGRESS NOTES
23     Vianca Persaud    : 1980 Sex: male   Age: 40 y.o. Patient was referred by: Ankita Tang PA-C  Patient's PCP/Provider is:  Faisal Nunn DO    Subjective:    Patient seen today for evaluation regarding bilateral foot pain. Chief Complaint   Patient presents with    New Patient    Callouses    Foot Pain     Bilateral foot pain       HPI: Patient stated issues have been going on into the plantar forefoot regions for several months. He does get recurrent calluses to the forefoot region which does cause discomfort with certain activities and shoe gear. He did want to discuss treatment options available at this time. Patient has not tried any OTC treatments to this point in time. No other additional abnormalities noted. ROS:  Const: Positives and pertinent negatives as per HPI. Musculo: Denies symptoms other than stated above. Neuro: Denies symptoms other than stated above. Skin: Denies symptoms other than stated above.     Current Medications:    Current Outpatient Medications:     celecoxib (CELEBREX) 200 MG capsule, Take 1 capsule by mouth daily, Disp: 30 capsule, Rfl: 3    gabapentin (NEURONTIN) 400 MG capsule, TAKE ONE CAPSULE BY MOUTH FOUR TIMES A DAY, Disp: , Rfl:     sertraline (ZOLOFT) 100 MG tablet, Take 1 tablet by mouth daily Take 2 tablets daily, Disp: , Rfl:     buPROPion (WELLBUTRIN SR) 200 MG extended release tablet, Take 1 tablet by mouth daily, Disp: , Rfl:     Allergies:  No Known Allergies    Vitals:    23 1411   Weight: 236 lb (107 kg)   Height: 6' 3\" (1.905 m)        Past Medical History:   Diagnosis Date    Bipolar disorder (HCC)     Colon polyp     Epidermoid cyst     IBS (irritable bowel syndrome)     IBS (irritable bowel syndrome)     Migraine     Migraine     OCD (obsessive compulsive disorder)      Family History   Problem Relation Age of Onset    High Blood Pressure Mother     Asthma Mother     Asthma Brother      Past Surgical History:

## 2023-04-26 ENCOUNTER — TREATMENT (OUTPATIENT)
Dept: PHYSICAL THERAPY | Age: 43
End: 2023-04-26
Payer: COMMERCIAL

## 2023-04-26 DIAGNOSIS — M17.0 PRIMARY OSTEOARTHRITIS OF BOTH KNEES: Primary | ICD-10-CM

## 2023-04-26 PROCEDURE — 97530 THERAPEUTIC ACTIVITIES: CPT

## 2023-04-26 PROCEDURE — 97110 THERAPEUTIC EXERCISES: CPT

## 2023-04-26 NOTE — PROGRESS NOTES
Physical Therapy Daily Treatment Note    Date: 2023  Patient Name: Yisel Haq  : 1980   MRN: [id number]  [facility]:  insidious   DOSx: -    Referring Provider:   Angela Stroud PA-C  [address] [facility]       Medical Diagnosis:   M17.0 (ICD-10-CM) - Bilateral primary osteoarthritis of knee    Annette Cheng has anterior knee pain related to his extensor mechanism as evidenced by his pain resisting his dog pulling, descending stairs, and squatting. We will start a loading program of SLR progressing to knee extension exercises. X = TO BE PERFORMED NEXT VISIT  > = PROGRESS TO THIS    S: Patient reports soreness after last session that lasted through that evening but better now. O: Discussed anatomy, physiology, body mechanics, principles of loading, and progressive loading/activity. Time 6720-3435     Visit 3 Repeat outcome measure at mid point and end. Pain Pain at rest 0/10     ROM      Modalities         MO   Manual            Stretch      Towel / strap DF, INV, EV   TE      TE   Exercise      Nustep L5 x 10 min   TA   Ball / can rolling   TE   Toe curls      Heel slides   TE   QS   TE   SLR 3 x 10, 3 sec hold each  TE   SAQ   TE   LAQ 2# 3 x 10, 3-5 sec hold each     [x] TG  [] Leg Press 2-leg L20 2 x 20 reps   TE   [] TG  [] Leg Press 1-leg   TE   Hamstring Curl Machine   TE   Knee Extension Machine   TE   Step-ups - FWD   TA   Step-ups - LAT x  TA   Step-ups - BWD   TA   Heel touches x  TA      TA      TA               A:Tolerated well. Large, functional, dynamic, global movements used to build strength, balance, endurance, and flexibility and to improve physical performance. Provided HEP.   P: Continue with rehab plan  Rene Briscoe PTA    Treatment Charges: Mins Units   Initial Evaluation     Re-Evaluation     Ther Exercise         TE 15 1   Manual Therapy     MT     Ther Activities        TA 15 1   Gait Training          GT     Neuro Re-education NR     Modalities

## 2023-05-01 ENCOUNTER — TREATMENT (OUTPATIENT)
Dept: PHYSICAL THERAPY | Age: 43
End: 2023-05-01
Payer: COMMERCIAL

## 2023-05-01 DIAGNOSIS — M17.0 PRIMARY OSTEOARTHRITIS OF BOTH KNEES: Primary | ICD-10-CM

## 2023-05-01 PROCEDURE — 97110 THERAPEUTIC EXERCISES: CPT

## 2023-05-01 PROCEDURE — 97530 THERAPEUTIC ACTIVITIES: CPT

## 2023-05-01 NOTE — PROGRESS NOTES
Physical Therapy Daily Treatment Note    Date: 2023  Patient Name: Rachele Akhtar  : 1980   MRN: [id number]  [facility]:  insidious   DOSx: -    Referring Provider:   Jaleesa Oliver PA-C  [address] [facility]       Medical Diagnosis:   M17.0 (ICD-10-CM) - Bilateral primary osteoarthritis of knee    Oliver Matamoros has anterior knee pain related to his extensor mechanism as evidenced by his pain resisting his dog pulling, descending stairs, and squatting. We will start a loading program of SLR progressing to knee extension exercises. X = TO BE PERFORMED NEXT VISIT  > = PROGRESS TO THIS    S: Patient reports he didn't take Celebrex today so feeling it more than normal. He wasn't able to exercise Saturday but did . Was on feet a lot Saturday where he didn't feel up to doing HEP afterwards. O: Discussed anatomy, physiology, body mechanics, principles of loading, and progressive loading/activity. Time 5146-6977     Visit 4 Repeat outcome measure at mid point and end. Pain 3-4/10     ROM      Modalities         MO   Manual            Stretch      Towel / strap DF, INV, EV   TE      TE   Exercise      Nustep L5 x 10 min  Seat 13, Arms 10  TA   Ball / can rolling   TE   Toe curls      Heel slides   TE   QS   TE   SLR  TE   SAQ   TE   LAQ 5# 3 x 15, 3-5 sec hold each  TA   [x] TG  [] Leg Press 2-leg L20 2 x 20 reps   TE   [] TG  [] Leg Press 1-leg   TE   Hamstring Curl Machine   TE   Knee Extension Machine   TE   Step-ups - FWD   TA   Step-ups - LAT 7\"  x 20 reps bilateral  TA   Step-ups - BWD   TA   Heel touches 2 x 10 bilateral  TA      TA      TA               A:Tolerated well. Large, functional, dynamic, global movements used to build strength, balance, endurance, and flexibility and to improve physical performance. Provided HEP.   P: Continue with rehab plan  Kimberlee Shah PTA    Treatment Charges: Mins Units   Initial Evaluation     Re-Evaluation     Ther Exercise         TE

## 2023-05-03 ENCOUNTER — TREATMENT (OUTPATIENT)
Dept: PHYSICAL THERAPY | Age: 43
End: 2023-05-03
Payer: COMMERCIAL

## 2023-05-03 DIAGNOSIS — M17.0 PRIMARY OSTEOARTHRITIS OF BOTH KNEES: Primary | ICD-10-CM

## 2023-05-03 PROCEDURE — 97530 THERAPEUTIC ACTIVITIES: CPT

## 2023-05-03 PROCEDURE — 97110 THERAPEUTIC EXERCISES: CPT

## 2023-05-03 NOTE — PROGRESS NOTES
Physical Therapy Daily Treatment Note    Date: 5/3/2023  Patient Name: Michael Romeo  : 1980   MRN: [id number]  [facility]:  insidious   DOSx: -    Referring Provider:   Gabino Clemente PA-C  [address] [facility]       Medical Diagnosis:   M17.0 (ICD-10-CM) - Bilateral primary osteoarthritis of knee    Allie Najera has anterior knee pain related to his extensor mechanism as evidenced by his pain resisting his dog pulling, descending stairs, and squatting. We will start a loading program of SLR progressing to knee extension exercises. X = TO BE PERFORMED NEXT VISIT  > = PROGRESS TO THIS    S: Patient reports left knee bothering him today but not too bad. He wasn't too sore after last session. O: Discussed anatomy, physiology, body mechanics, principles of loading, and progressive loading/activity. Time 7195-9891     Visit 4 Repeat outcome measure at mid point and end. Pain 3-4/10     ROM      Modalities         MO   Manual            Stretch      Towel / strap DF, INV, EV   TE      TE   Exercise      Nustep L5 x 10 min  Seat 13, Arms 10  TA   Ball / can rolling   TE   Toe curls      Heel slides   TE   QS   TE   SLR  TE   SAQ   TE   LAQ 5# 3 x 15, 3-5 sec hold each  TA   [x] TG  [] Leg Press 2-leg L20 2 x 20 reps   TE   [] TG  [] Leg Press 1-leg   TE   Hamstring Curl Machine   TE   Knee Extension Machine   TE   Step-ups - FWD   TA   Step-ups - LAT 7\"  x 30 reps bilateral  TA   Step-ups - BWD   TA   Heel touches 2 x 10 bilateral  TA      TA      TA               A:Tolerated well. Large, functional, dynamic, global movements used to build strength, balance, endurance, and flexibility and to improve physical performance. Provided HEP.   P: Continue with rehab plan  Aly Alas PTA    Treatment Charges: Mins Units   Initial Evaluation     Re-Evaluation     Ther Exercise         TE 15 1   Manual Therapy     MT     Ther Activities        TA 15 1   Gait Training          GT     Neuro

## 2023-05-08 ENCOUNTER — TREATMENT (OUTPATIENT)
Dept: PHYSICAL THERAPY | Age: 43
End: 2023-05-08
Payer: COMMERCIAL

## 2023-05-08 DIAGNOSIS — M17.0 PRIMARY OSTEOARTHRITIS OF BOTH KNEES: Primary | ICD-10-CM

## 2023-05-08 PROCEDURE — 97530 THERAPEUTIC ACTIVITIES: CPT

## 2023-05-08 PROCEDURE — 97110 THERAPEUTIC EXERCISES: CPT

## 2023-05-08 NOTE — PROGRESS NOTES
Physical Therapy Daily Treatment Note    Date: 2023  Patient Name: Sona Graham  : 1980   MRN: [id number]  [facility]:  insidious   DOSx: -    Referring Provider:   Kathy Calderón PA-C  [address] [facility]       Medical Diagnosis:   M17.0 (ICD-10-CM) - Bilateral primary osteoarthritis of knee    Gemma Shannon has anterior knee pain related to his extensor mechanism as evidenced by his pain resisting his dog pulling, descending stairs, and squatting. We will start a loading program of SLR progressing to knee extension exercises. X = TO BE PERFORMED NEXT VISIT  > = PROGRESS TO THIS    S: Patient reports no changes, doing good. O: Discussed anatomy, physiology, body mechanics, principles of loading, and progressive loading/activity. Time 6784-0030     Visit 5 Repeat outcome measure at mid point and end. Pain 3-4/10     ROM      Modalities         MO   Manual            Stretch      Towel / strap DF, INV, EV   TE      TE   Exercise      Nustep L5 x 10 min  Seat 13, Arms 10  TA   Ball / can rolling   TE   Toe curls      Heel slides   TE   QS   TE   SLR  TE   SAQ   TE   LAQ 7.5# 3 x 15, 3-5 sec hold each  TA   [] TG  [x] Leg Press 2-leg 40# 3 x 15 reps  TE   [] TG  [] Leg Press 1-leg   TE   Hamstring Curl Machine 50# 3 x 10  TE   Knee Extension Machine Next  TE   Step-ups - FWD   TA   Step-ups - LAT 7\"  x 30 reps bilateral  TA   Step-ups - BWD   TA   Heel touches 7\" 2 x 10 bilateral  TA      TA      TA               A:Tolerated well. Large, functional, dynamic, global movements used to build strength, balance, endurance, and flexibility and to improve physical performance. Provided HEP.   P: Continue with rehab plan  Hessie Comp, PTA    Treatment Charges: Mins Units   Initial Evaluation     Re-Evaluation     Ther Exercise         TE 15 1   Manual Therapy     MT     Ther Activities        TA 15 1   Gait Training          GT     Neuro Re-education NR     Modalities     Non-Billable

## 2023-05-10 ENCOUNTER — TREATMENT (OUTPATIENT)
Dept: PHYSICAL THERAPY | Age: 43
End: 2023-05-10

## 2023-05-10 DIAGNOSIS — M17.0 PRIMARY OSTEOARTHRITIS OF BOTH KNEES: Primary | ICD-10-CM

## 2023-05-12 NOTE — PROGRESS NOTES
Physical Therapy Daily Treatment Note    Date: 5/10/2023  Patient Name: Ricarda Cockayne  : 1980   MRN: [id number]  [facility]:  insidious   DOSx: -    Referring Provider:   Antonio Altamirano PA-C  [address] [facility]       Medical Diagnosis:   M17.0 (ICD-10-CM) - Bilateral primary osteoarthritis of knee    Jairo Montes has anterior knee pain related to his extensor mechanism as evidenced by his pain resisting his dog pulling, descending stairs, and squatting. We will start a loading program of SLR progressing to knee extension exercises. X = TO BE PERFORMED NEXT VISIT  > = PROGRESS TO THIS    S: Patient reports left knee a little sore but noticing improvements and recovering quicker from exercises. O: Discussed anatomy, physiology, body mechanics, principles of loading, and progressive loading/activity. Time 8429-8098     Visit 6 Repeat outcome measure at mid point and end. Pain 3/10     ROM      Modalities         MO   Manual            Stretch      Towel / strap DF, INV, EV   TE      TE   Exercise      Nustep L5 x 10 min  Seat 13, Arms 10  TA   Ball / can rolling   TE   Toe curls      Heel slides   TE   QS   TE   SLR  TE   SAQ   TE   LAQ  TA   [] TG  [x] Leg Press 2-leg 60# 3 x 12  TE   [] TG  [] Leg Press 1-leg   TE   Hamstring Curl Machine 50# 3 x 12  TE   Knee Extension Machine 5# 3 x 12  TE   Step-ups - FWD   TA   Step-ups - LAT 7\"  x 30 reps bilateral  TA   Step-ups - BWD   TA   Heel touches 7\" 2 x 10 bilateral  TA      TA      TA               A:Tolerated well. Large, functional, dynamic, global movements used to build strength, balance, endurance, and flexibility and to improve physical performance. Provided HEP.   P: Continue with rehab plan  Marisol Marsh PTA    Treatment Charges: Mins Units   Initial Evaluation     Re-Evaluation     Ther Exercise         TE 30 2   Manual Therapy     MT     Ther Activities        TA 10 1   Gait Training          GT     Neuro Re-education X Size Of Lesion In Cm (Optional): 1.4

## 2023-05-15 ENCOUNTER — TREATMENT (OUTPATIENT)
Dept: PHYSICAL THERAPY | Age: 43
End: 2023-05-15
Payer: COMMERCIAL

## 2023-05-15 DIAGNOSIS — M17.0 PRIMARY OSTEOARTHRITIS OF BOTH KNEES: Primary | ICD-10-CM

## 2023-05-15 PROCEDURE — 97530 THERAPEUTIC ACTIVITIES: CPT

## 2023-05-15 PROCEDURE — 97110 THERAPEUTIC EXERCISES: CPT

## 2023-05-15 NOTE — PROGRESS NOTES
Physical Therapy Daily Treatment Note    Date: 5/15/2023  Patient Name: Benson Hastings  : 1980   MRN: [id number]  [facility]:  insidious   DOSx: -    Referring Provider:   Indra Barker PA-C  [address] [facility]  [address]       Medical Diagnosis:   M17.0 (ICD-10-CM) - Bilateral primary osteoarthritis of knee    Dina Galvan has anterior knee pain related to his extensor mechanism as evidenced by his pain resisting his dog pulling, descending stairs, and squatting. We will start a loading program of SLR progressing to knee extension exercises. X = TO BE PERFORMED NEXT VISIT  > = PROGRESS TO THIS    S: Patient reports left knee a little sore but noticing improvements and recovering quicker from exercises. O: Discussed anatomy, physiology, body mechanics, principles of loading, and progressive loading/activity. Time 0376-4597     Visit 7 Repeat outcome measure at mid point and end. Pain 3/10     ROM      Modalities         MO   Manual            Stretch      Towel / strap DF, INV, EV   TE      TE   Exercise       Recumbent bike L10 x 10 min   TA   Ball / can rolling   TE   Toe curls      Heel slides   TE   QS   TE   SLR  TE   SAQ   TE   LAQ  TA   [] TG  [x] Leg Press 2-leg 100# 3 x 12  TE   [] TG  [] Leg Press 1-leg   TE   Hamstring Curl Machine 50# 3 x 12  TE   Knee Extension Machine 5# 3 x 12  TE   Step-ups - FWD   TA   Step-ups - LAT 7\"  x 30 reps bilateral  TA   Step-ups - BWD   TA   Heel touches 7\" 2 x 10 bilateral  TA      TA      TA               A:Tolerated well. Large, functional, dynamic, global movements used to build strength, balance, endurance, and flexibility and to improve physical performance. Provided HEP.   P: Continue with rehab plan  Chelsi Lu PTA    Treatment Charges: Mins Units   Initial Evaluation     Re-Evaluation     Ther Exercise         TE 15 1   Manual Therapy     MT     Ther Activities        TA 15 1   Gait Training          GT     Neuro Re-education NR

## 2023-05-17 ENCOUNTER — TREATMENT (OUTPATIENT)
Dept: PHYSICAL THERAPY | Age: 43
End: 2023-05-17
Payer: COMMERCIAL

## 2023-05-17 DIAGNOSIS — M17.0 PRIMARY OSTEOARTHRITIS OF BOTH KNEES: Primary | ICD-10-CM

## 2023-05-17 PROCEDURE — 97110 THERAPEUTIC EXERCISES: CPT

## 2023-05-17 PROCEDURE — 97530 THERAPEUTIC ACTIVITIES: CPT

## 2023-05-17 NOTE — PROGRESS NOTES
Physical Therapy Daily Treatment Note    Date: 2023  Patient Name: Gerald Stokes  : 1980   MRN: [id number]  [facility]:  insidious   DOSx: -    Referring Provider:   Padma Cheney PA-C  [address] [facility]       Medical Diagnosis:   M17.0 (ICD-10-CM) - Bilateral primary osteoarthritis of knee    Lord Shruthi has anterior knee pain related to his extensor mechanism as evidenced by his pain resisting his dog pulling, descending stairs, and squatting. We will start a loading program of SLR progressing to knee extension exercises. X = TO BE PERFORMED NEXT VISIT  > = PROGRESS TO THIS    S: Patient reports no changes. O: Discussed anatomy, physiology, body mechanics, principles of loading, and progressive loading/activity. Time 4177-4292     Visit 8 Repeat outcome measure at mid point and end. Pain 3/10     ROM      Modalities         MO   Manual            Stretch      Towel / strap DF, INV, EV   TE      TE   Exercise       Recumbent bike L10 x 10 min  2.3  TA   Ball / can rolling   TE   Toe curls      Heel slides   TE   QS   TE   SLR  TE   SAQ   TE   LAQ  TA   [] TG  [x] Leg Press 2-leg 100# 3 x 15  TE   [] TG  [] Leg Press 1-leg   TE   Hamstring Curl Machine 50# 3 x 12  TE   Knee Extension Machine 5# 3 x 12  TE   Step-ups - FWD   TA   Step-ups - LAT 7\"  x 30 reps bilateral  TA   Step-ups - BWD   TA   Heel touches 7\" 2 x 10 bilateral  TA      TA      TA               A:Tolerated well. Large, functional, dynamic, global movements used to build strength, balance, endurance, and flexibility and to improve physical performance. Provided HEP.   P: Continue with rehab plan  Shakir Mc PTA    Treatment Charges: Mins Units   Initial Evaluation     Re-Evaluation     Ther Exercise         TE 15 1   Manual Therapy     MT     Ther Activities        TA 15 1   Gait Training          GT     Neuro Re-education NR     Modalities     Non-Billable Service Time 10 0   Other     Total

## 2023-05-22 ENCOUNTER — TREATMENT (OUTPATIENT)
Dept: PHYSICAL THERAPY | Age: 43
End: 2023-05-22
Payer: COMMERCIAL

## 2023-05-22 DIAGNOSIS — M17.0 PRIMARY OSTEOARTHRITIS OF BOTH KNEES: Primary | ICD-10-CM

## 2023-05-22 PROCEDURE — 97530 THERAPEUTIC ACTIVITIES: CPT

## 2023-05-22 PROCEDURE — 97110 THERAPEUTIC EXERCISES: CPT

## 2023-05-24 ENCOUNTER — TREATMENT (OUTPATIENT)
Dept: PHYSICAL THERAPY | Age: 43
End: 2023-05-24

## 2023-05-24 DIAGNOSIS — M17.0 PRIMARY OSTEOARTHRITIS OF BOTH KNEES: Primary | ICD-10-CM

## 2023-05-24 NOTE — PROGRESS NOTES
Physical Therapy Daily Treatment Note    Date: 2023  Patient Name: Radha Caraballo  : 1980   MRN: [id number]  [facility]:  insidious   DOSx: -    Referring Provider:   Florinda Hinojosa PA-C  [address] [facility] [address]       Medical Diagnosis:   M17.0 (ICD-10-CM) - Bilateral primary osteoarthritis of knee    Moe Hagan has anterior knee pain related to his extensor mechanism as evidenced by his pain resisting his dog pulling, descending stairs, and squatting. We will start a loading program of SLR progressing to knee extension exercises. X = TO BE PERFORMED NEXT VISIT  > = PROGRESS TO THIS    S: Patient reports he lunged forward to catch dog yesterday and felt he twisted his knee the wrong way. O: Discussed anatomy, physiology, body mechanics, principles of loading, and progressive loading/activity. Time 7518-5097     Visit 10 Repeat outcome measure at mid point and end. Pain 3/10     ROM      Modalities         MO   Manual            Stretch      Towel / strap DF, INV, EV   TE      TE   Exercise       Recumbent bike L10 x 10 min  2.3  TA   Ball / can rolling   TE   Toe curls      Heel slides   TE   QS   TE   SLR  TE   SAQ   TE   LAQ  TA   [] TG  [x] Leg Press 2-leg 120# 3 x 15  TE   [] TG  [] Leg Press 1-leg   TE   Hamstring Curl Machine 60# 3 x 12  TE   Knee Extension Machine 10# 3 x 12  TE   Step-ups - FWD   TA   Step-ups - LAT 7\" x 30 reps bilateral  TA   Step-ups - BWD   TA   Heel touches 7\" 2 x 10 bilateral  TA      TA      TA               A:Tolerated well. Large, functional, dynamic, global movements used to build strength, balance, endurance, and flexibility and to improve physical performance. Provided HEP. P: Today was patient's last session and they are to continue with HEP.   Stacy Khanna PTA    Treatment Charges: Mins Units   Initial Evaluation     Re-Evaluation     Ther Exercise         TE 15 1   Manual Therapy     MT     Ther Activities        TA 25 2   Gait

## 2023-08-13 DIAGNOSIS — M17.0 BILATERAL PRIMARY OSTEOARTHRITIS OF KNEE: ICD-10-CM

## 2023-08-14 RX ORDER — CELECOXIB 200 MG/1
200 CAPSULE ORAL DAILY
Qty: 30 CAPSULE | Refills: 3 | Status: SHIPPED | OUTPATIENT
Start: 2023-08-14

## 2024-04-29 ENCOUNTER — OFFICE VISIT (OUTPATIENT)
Dept: PRIMARY CARE CLINIC | Age: 44
End: 2024-04-29
Payer: COMMERCIAL

## 2024-04-29 VITALS
WEIGHT: 243 LBS | TEMPERATURE: 98 F | DIASTOLIC BLOOD PRESSURE: 84 MMHG | BODY MASS INDEX: 30.21 KG/M2 | SYSTOLIC BLOOD PRESSURE: 122 MMHG | HEART RATE: 78 BPM | HEIGHT: 75 IN | OXYGEN SATURATION: 99 %

## 2024-04-29 DIAGNOSIS — Z00.00 WELL ADULT EXAM: Primary | ICD-10-CM

## 2024-04-29 DIAGNOSIS — E78.5 HYPERLIPIDEMIA, UNSPECIFIED HYPERLIPIDEMIA TYPE: ICD-10-CM

## 2024-04-29 DIAGNOSIS — M17.0 PRIMARY OSTEOARTHRITIS OF BOTH KNEES: ICD-10-CM

## 2024-04-29 PROCEDURE — 99396 PREV VISIT EST AGE 40-64: CPT | Performed by: FAMILY MEDICINE

## 2024-04-29 SDOH — ECONOMIC STABILITY: HOUSING INSECURITY
IN THE LAST 12 MONTHS, WAS THERE A TIME WHEN YOU DID NOT HAVE A STEADY PLACE TO SLEEP OR SLEPT IN A SHELTER (INCLUDING NOW)?: NO

## 2024-04-29 SDOH — HEALTH STABILITY: PHYSICAL HEALTH: ON AVERAGE, HOW MANY MINUTES DO YOU ENGAGE IN EXERCISE AT THIS LEVEL?: 20 MIN

## 2024-04-29 SDOH — ECONOMIC STABILITY: FOOD INSECURITY: WITHIN THE PAST 12 MONTHS, YOU WORRIED THAT YOUR FOOD WOULD RUN OUT BEFORE YOU GOT MONEY TO BUY MORE.: NEVER TRUE

## 2024-04-29 SDOH — ECONOMIC STABILITY: INCOME INSECURITY: HOW HARD IS IT FOR YOU TO PAY FOR THE VERY BASICS LIKE FOOD, HOUSING, MEDICAL CARE, AND HEATING?: NOT HARD AT ALL

## 2024-04-29 SDOH — ECONOMIC STABILITY: FOOD INSECURITY: WITHIN THE PAST 12 MONTHS, THE FOOD YOU BOUGHT JUST DIDN'T LAST AND YOU DIDN'T HAVE MONEY TO GET MORE.: NEVER TRUE

## 2024-04-29 SDOH — HEALTH STABILITY: PHYSICAL HEALTH: ON AVERAGE, HOW MANY DAYS PER WEEK DO YOU ENGAGE IN MODERATE TO STRENUOUS EXERCISE (LIKE A BRISK WALK)?: 7 DAYS

## 2024-04-29 ASSESSMENT — PATIENT HEALTH QUESTIONNAIRE - PHQ9
SUM OF ALL RESPONSES TO PHQ9 QUESTIONS 1 & 2: 5
SUM OF ALL RESPONSES TO PHQ QUESTIONS 1-9: 13
4. FEELING TIRED OR HAVING LITTLE ENERGY: MORE THAN HALF THE DAYS
8. MOVING OR SPEAKING SO SLOWLY THAT OTHER PEOPLE COULD HAVE NOTICED. OR THE OPPOSITE, BEING SO FIGETY OR RESTLESS THAT YOU HAVE BEEN MOVING AROUND A LOT MORE THAN USUAL: NOT AT ALL
6. FEELING BAD ABOUT YOURSELF - OR THAT YOU ARE A FAILURE OR HAVE LET YOURSELF OR YOUR FAMILY DOWN: MORE THAN HALF THE DAYS
2. FEELING DOWN, DEPRESSED OR HOPELESS: MORE THAN HALF THE DAYS
SUM OF ALL RESPONSES TO PHQ QUESTIONS 1-9: 13
5. POOR APPETITE OR OVEREATING: MORE THAN HALF THE DAYS
SUM OF ALL RESPONSES TO PHQ QUESTIONS 1-9: 13
SUM OF ALL RESPONSES TO PHQ QUESTIONS 1-9: 13
7. TROUBLE CONCENTRATING ON THINGS, SUCH AS READING THE NEWSPAPER OR WATCHING TELEVISION: NOT AT ALL
1. LITTLE INTEREST OR PLEASURE IN DOING THINGS: NEARLY EVERY DAY
10. IF YOU CHECKED OFF ANY PROBLEMS, HOW DIFFICULT HAVE THESE PROBLEMS MADE IT FOR YOU TO DO YOUR WORK, TAKE CARE OF THINGS AT HOME, OR GET ALONG WITH OTHER PEOPLE: NOT DIFFICULT AT ALL
9. THOUGHTS THAT YOU WOULD BE BETTER OFF DEAD, OR OF HURTING YOURSELF: NOT AT ALL
3. TROUBLE FALLING OR STAYING ASLEEP: MORE THAN HALF THE DAYS

## 2024-04-29 NOTE — PROGRESS NOTES
Rakan Oleary (:  1980) is a 43 y.o. male,Established patient, here for evaluation of the following chief complaint(s):  New Patient (New to Provider Pt overall is feeling well. Request lab reg)      Assessment & Plan   ASSESSMENT/PLAN:  1. Well adult exam  -     CBC with Auto Differential; Future  -     Comprehensive Metabolic Panel, Fasting; Future  -     Lipid Panel; Future  -     TSH; Future  2. Hyperlipidemia, unspecified hyperlipidemia type  -     CBC with Auto Differential; Future  -     Comprehensive Metabolic Panel, Fasting; Future  -     Lipid Panel; Future  -     TSH; Future  3. Primary osteoarthritis of both knees      PLAN:  Labs ordered.    Return in about 1 year (around 2025) for AWV.         Subjective   SUBJECTIVE/OBJECTIVE:  Patient here to be reestablished with me.  He offers no specific complaints at the present time.  He is nonfasting and requesting a laboratory requisition.        Review of Systems   Constitutional:  Negative for chills, fatigue and fever.   HENT:  Negative for congestion, ear discharge, ear pain, facial swelling, hearing loss, nosebleeds, rhinorrhea, sinus pressure and sore throat.    Eyes:  Negative for photophobia, pain, discharge, itching and visual disturbance.   Respiratory:  Negative for cough, shortness of breath and wheezing.    Cardiovascular:  Negative for chest pain, palpitations and leg swelling.   Gastrointestinal:  Negative for abdominal distention, abdominal pain, blood in stool, constipation, diarrhea, nausea and vomiting.   Endocrine: Negative for polydipsia, polyphagia and polyuria.   Genitourinary:  Negative for difficulty urinating, dysuria, frequency, hematuria and urgency.   Musculoskeletal:  Negative for arthralgias, joint swelling and myalgias.   Skin:  Negative for color change and rash.   Allergic/Immunologic: Negative for environmental allergies and food allergies.   Neurological:  Negative for dizziness, seizures, syncope,

## 2024-05-04 ASSESSMENT — ENCOUNTER SYMPTOMS
NAUSEA: 0
PHOTOPHOBIA: 0
CONSTIPATION: 0
SHORTNESS OF BREATH: 0
EYE DISCHARGE: 0
COLOR CHANGE: 0
ABDOMINAL DISTENTION: 0
COUGH: 0
EYE PAIN: 0
BLOOD IN STOOL: 0
EYE ITCHING: 0
RHINORRHEA: 0
ABDOMINAL PAIN: 0
VOMITING: 0
SORE THROAT: 0
WHEEZING: 0
SINUS PRESSURE: 0
DIARRHEA: 0
FACIAL SWELLING: 0

## 2024-05-13 DIAGNOSIS — M17.0 BILATERAL PRIMARY OSTEOARTHRITIS OF KNEE: ICD-10-CM

## 2024-05-14 RX ORDER — CELECOXIB 200 MG/1
200 CAPSULE ORAL DAILY
Qty: 30 CAPSULE | Refills: 3 | Status: SHIPPED | OUTPATIENT
Start: 2024-05-14

## 2024-09-30 DIAGNOSIS — M17.0 BILATERAL PRIMARY OSTEOARTHRITIS OF KNEE: ICD-10-CM

## 2024-09-30 RX ORDER — CELECOXIB 200 MG/1
200 CAPSULE ORAL DAILY
Qty: 30 CAPSULE | Refills: 3 | Status: SHIPPED | OUTPATIENT
Start: 2024-09-30

## 2025-02-24 DIAGNOSIS — M17.0 BILATERAL PRIMARY OSTEOARTHRITIS OF KNEE: ICD-10-CM

## 2025-02-24 RX ORDER — CELECOXIB 200 MG/1
200 CAPSULE ORAL DAILY
Qty: 30 CAPSULE | Refills: 3 | Status: SHIPPED | OUTPATIENT
Start: 2025-02-24

## 2025-02-24 NOTE — TELEPHONE ENCOUNTER
Name of Medication(s) Requested:  Requested Prescriptions     Pending Prescriptions Disp Refills    celecoxib (CELEBREX) 200 MG capsule 30 capsule 3     Sig: Take 1 capsule by mouth daily       Medication is on current medication list Yes    Dosage and directions were verified? Yes    Quantity verified: 30 day supply     Pharmacy Verified?  Yes    Last Appointment:  4/29/2024    Future appts:  Future Appointments   Date Time Provider Department Center   4/29/2025  1:00 PM Tye Mojica DO [facility] DEP        (If no appt send self scheduling link. .REFILLAPPT)  Scheduling request sent?     [] Yes  [x] No    Does patient need updated?  [] Yes  [x] No

## 2025-03-19 ENCOUNTER — HOSPITAL ENCOUNTER (EMERGENCY)
Age: 45
Discharge: HOME | End: 2025-03-19
Payer: COMMERCIAL

## 2025-03-19 VITALS
SYSTOLIC BLOOD PRESSURE: 128 MMHG | WEIGHT: 235 LBS | HEART RATE: 90 BPM | OXYGEN SATURATION: 96 % | BODY MASS INDEX: 29.37 KG/M2 | DIASTOLIC BLOOD PRESSURE: 93 MMHG | TEMPERATURE: 98.4 F | RESPIRATION RATE: 20 BRPM

## 2025-03-19 DIAGNOSIS — J10.1 INFLUENZA A: Primary | ICD-10-CM

## 2025-03-19 LAB
FLUAV RNA RESP QL NAA+PROBE: DETECTED
FLUBV RNA RESP QL NAA+PROBE: NOT DETECTED
SARS-COV-2 RNA RESP QL NAA+PROBE: NOT DETECTED
SOURCE: ABNORMAL
SPECIMEN DESCRIPTION: ABNORMAL
SPECIMEN SOURCE: NORMAL
STREP A, MOLECULAR: NEGATIVE

## 2025-03-19 PROCEDURE — 99211 OFF/OP EST MAY X REQ PHY/QHP: CPT

## 2025-03-19 PROCEDURE — 87636 SARSCOV2 & INF A&B AMP PRB: CPT

## 2025-03-19 PROCEDURE — 87651 STREP A DNA AMP PROBE: CPT

## 2025-03-19 RX ORDER — METHYLPREDNISOLONE 4 MG/1
TABLET ORAL
Qty: 1 KIT | Refills: 0 | Status: SHIPPED | OUTPATIENT
Start: 2025-03-19

## 2025-03-19 RX ORDER — ONDANSETRON 4 MG/1
4 TABLET, ORALLY DISINTEGRATING ORAL 3 TIMES DAILY PRN
Qty: 15 TABLET | Refills: 0 | Status: SHIPPED | OUTPATIENT
Start: 2025-03-19

## 2025-03-19 ASSESSMENT — PAIN SCALES - GENERAL: PAINLEVEL_OUTOF10: 0

## 2025-03-19 ASSESSMENT — PAIN - FUNCTIONAL ASSESSMENT: PAIN_FUNCTIONAL_ASSESSMENT: 0-10

## 2025-03-19 NOTE — ED PROVIDER NOTES
Discharge to home  Patient condition is good    I am the Primary Clinician of Record.     Mp Kothari PA-C (electronically signed) on 3/19/2025 at 1:16 PM         Mp Kothari PA-C  03/19/25 [id number]

## 2025-04-26 ASSESSMENT — PATIENT HEALTH QUESTIONNAIRE - PHQ9
3. TROUBLE FALLING OR STAYING ASLEEP: NEARLY EVERY DAY
8. MOVING OR SPEAKING SO SLOWLY THAT OTHER PEOPLE COULD HAVE NOTICED. OR THE OPPOSITE - BEING SO FIDGETY OR RESTLESS THAT YOU HAVE BEEN MOVING AROUND A LOT MORE THAN USUAL: NOT AT ALL
5. POOR APPETITE OR OVEREATING: SEVERAL DAYS
SUM OF ALL RESPONSES TO PHQ QUESTIONS 1-9: 8
3. TROUBLE FALLING OR STAYING ASLEEP: NEARLY EVERY DAY
10. IF YOU CHECKED OFF ANY PROBLEMS, HOW DIFFICULT HAVE THESE PROBLEMS MADE IT FOR YOU TO DO YOUR WORK, TAKE CARE OF THINGS AT HOME, OR GET ALONG WITH OTHER PEOPLE: SOMEWHAT DIFFICULT
10. IF YOU CHECKED OFF ANY PROBLEMS, HOW DIFFICULT HAVE THESE PROBLEMS MADE IT FOR YOU TO DO YOUR WORK, TAKE CARE OF THINGS AT HOME, OR GET ALONG WITH OTHER PEOPLE: SOMEWHAT DIFFICULT
9. THOUGHTS THAT YOU WOULD BE BETTER OFF DEAD, OR OF HURTING YOURSELF: NOT AT ALL
7. TROUBLE CONCENTRATING ON THINGS, SUCH AS READING THE NEWSPAPER OR WATCHING TELEVISION: NOT AT ALL
SUM OF ALL RESPONSES TO PHQ QUESTIONS 1-9: 8
2. FEELING DOWN, DEPRESSED OR HOPELESS: SEVERAL DAYS
4. FEELING TIRED OR HAVING LITTLE ENERGY: SEVERAL DAYS
1. LITTLE INTEREST OR PLEASURE IN DOING THINGS: SEVERAL DAYS
5. POOR APPETITE OR OVEREATING: SEVERAL DAYS
7. TROUBLE CONCENTRATING ON THINGS, SUCH AS READING THE NEWSPAPER OR WATCHING TELEVISION: NOT AT ALL
6. FEELING BAD ABOUT YOURSELF - OR THAT YOU ARE A FAILURE OR HAVE LET YOURSELF OR YOUR FAMILY DOWN: SEVERAL DAYS
8. MOVING OR SPEAKING SO SLOWLY THAT OTHER PEOPLE COULD HAVE NOTICED. OR THE OPPOSITE, BEING SO FIGETY OR RESTLESS THAT YOU HAVE BEEN MOVING AROUND A LOT MORE THAN USUAL: NOT AT ALL
SUM OF ALL RESPONSES TO PHQ QUESTIONS 1-9: 8
2. FEELING DOWN, DEPRESSED OR HOPELESS: SEVERAL DAYS
9. THOUGHTS THAT YOU WOULD BE BETTER OFF DEAD, OR OF HURTING YOURSELF: NOT AT ALL
SUM OF ALL RESPONSES TO PHQ9 QUESTIONS 1 & 2: 2
SUM OF ALL RESPONSES TO PHQ QUESTIONS 1-9: 8
4. FEELING TIRED OR HAVING LITTLE ENERGY: SEVERAL DAYS
SUM OF ALL RESPONSES TO PHQ QUESTIONS 1-9: 8
6. FEELING BAD ABOUT YOURSELF - OR THAT YOU ARE A FAILURE OR HAVE LET YOURSELF OR YOUR FAMILY DOWN: SEVERAL DAYS
1. LITTLE INTEREST OR PLEASURE IN DOING THINGS: SEVERAL DAYS

## 2025-04-29 ENCOUNTER — OFFICE VISIT (OUTPATIENT)
Dept: PRIMARY CARE CLINIC | Age: 45
End: 2025-04-29
Payer: COMMERCIAL

## 2025-04-29 VITALS
OXYGEN SATURATION: 97 % | DIASTOLIC BLOOD PRESSURE: 78 MMHG | HEART RATE: 96 BPM | SYSTOLIC BLOOD PRESSURE: 122 MMHG | HEIGHT: 75 IN | TEMPERATURE: 97.9 F | BODY MASS INDEX: 30.21 KG/M2 | WEIGHT: 243 LBS

## 2025-04-29 DIAGNOSIS — E55.9 VITAMIN D INSUFFICIENCY: ICD-10-CM

## 2025-04-29 DIAGNOSIS — Z00.00 WELL ADULT EXAM: Primary | ICD-10-CM

## 2025-04-29 DIAGNOSIS — M17.0 PRIMARY OSTEOARTHRITIS OF BOTH KNEES: ICD-10-CM

## 2025-04-29 DIAGNOSIS — E78.5 HYPERLIPIDEMIA, UNSPECIFIED HYPERLIPIDEMIA TYPE: ICD-10-CM

## 2025-04-29 PROCEDURE — 99396 PREV VISIT EST AGE 40-64: CPT | Performed by: FAMILY MEDICINE

## 2025-04-29 SDOH — ECONOMIC STABILITY: FOOD INSECURITY: WITHIN THE PAST 12 MONTHS, YOU WORRIED THAT YOUR FOOD WOULD RUN OUT BEFORE YOU GOT MONEY TO BUY MORE.: NEVER TRUE

## 2025-04-29 SDOH — ECONOMIC STABILITY: FOOD INSECURITY: WITHIN THE PAST 12 MONTHS, THE FOOD YOU BOUGHT JUST DIDN'T LAST AND YOU DIDN'T HAVE MONEY TO GET MORE.: NEVER TRUE

## 2025-04-29 NOTE — PROGRESS NOTES
Rakan Oleary (:  1980) is a 44 y.o. male,Established patient, here for evaluation of the following chief complaint(s):  Annual Exam (Pt request lab req. Pt complains of having a lot of pressure in his ears and notices sometimes difficulty hearing . He also admits to sinus)      Assessment & Plan   ASSESSMENT/PLAN:  1. Well adult exam  -     CBC with Auto Differential; Future  -     Comprehensive Metabolic Panel, Fasting; Future  -     Lipid Panel; Future  -     TSH; Future  2. Hyperlipidemia, unspecified hyperlipidemia type  -     Lipid Panel; Future  3. Primary osteoarthritis of both knees  4. Vitamin D insufficiency  -     Vitamin D 25 Hydroxy; Future      PLAN:  Labs ordered.  May take Tylenol, ibuprofen or naproxen as needed for the knees.  Recommend moderate intensity cardiovascular exercise minimum 150 minutes weekly.    Return in about 1 year (around 2026) for AWV, Labs.         Subjective   SUBJECTIVE/OBJECTIVE:  Patient here for an Annual Wellness Visit.  He Is Requesting a Lab Requisition.  He Complains Some Pressure in His Ears and Sometimes Difficulty Hearing.  Also Sinus Congestion.      Review of Systems   Constitutional:  Negative for chills, fatigue and fever.   HENT:  Positive for congestion, ear pain (Fullness), hearing loss and postnasal drip. Negative for ear discharge, facial swelling, nosebleeds, rhinorrhea, sinus pressure and sore throat.    Eyes:  Negative for photophobia, pain, discharge, itching and visual disturbance.   Respiratory:  Negative for cough, shortness of breath and wheezing.    Cardiovascular:  Negative for chest pain, palpitations and leg swelling.   Gastrointestinal:  Negative for abdominal distention, abdominal pain, blood in stool, constipation, diarrhea, nausea and vomiting.   Endocrine: Negative for polydipsia, polyphagia and polyuria.   Genitourinary:  Negative for difficulty urinating, dysuria, frequency, hematuria and urgency.   Musculoskeletal:

## 2025-05-06 ENCOUNTER — HOSPITAL ENCOUNTER (OUTPATIENT)
Age: 45
Discharge: HOME | End: 2025-05-06
Payer: COMMERCIAL

## 2025-05-06 DIAGNOSIS — Z00.00 WELL ADULT EXAM: ICD-10-CM

## 2025-05-06 DIAGNOSIS — E55.9 VITAMIN D INSUFFICIENCY: ICD-10-CM

## 2025-05-06 DIAGNOSIS — E78.5 HYPERLIPIDEMIA, UNSPECIFIED HYPERLIPIDEMIA TYPE: ICD-10-CM

## 2025-05-06 LAB
25(OH)D3 SERPL-MCNC: 21.8 NG/ML (ref 30–100)
ALBUMIN SERPL-MCNC: 4.3 G/DL (ref 3.5–5.2)
ALP SERPL-CCNC: 70 U/L (ref 40–129)
ALT SERPL-CCNC: 20 U/L (ref 0–40)
ANION GAP SERPL CALCULATED.3IONS-SCNC: 8 MMOL/L (ref 7–16)
AST SERPL-CCNC: 18 U/L (ref 0–39)
BASOPHILS # BLD: 0.03 K/UL (ref 0–0.2)
BASOPHILS NFR BLD: 1 % (ref 0–2)
BILIRUB SERPL-MCNC: 0.5 MG/DL (ref 0–1.2)
BUN SERPL-MCNC: 17 MG/DL (ref 6–20)
CALCIUM SERPL-MCNC: 8.8 MG/DL (ref 8.6–10.2)
CHLORIDE SERPL-SCNC: 103 MMOL/L (ref 98–107)
CHOLEST SERPL-MCNC: 181 MG/DL
CO2 SERPL-SCNC: 27 MMOL/L (ref 22–29)
CREAT SERPL-MCNC: 0.7 MG/DL (ref 0.7–1.2)
EOSINOPHIL # BLD: 0.08 K/UL (ref 0.05–0.5)
EOSINOPHILS RELATIVE PERCENT: 2 % (ref 0–6)
ERYTHROCYTE [DISTWIDTH] IN BLOOD BY AUTOMATED COUNT: 13.1 % (ref 11.5–15)
GFR, ESTIMATED: >90 ML/MIN/1.73M2
GLUCOSE P FAST SERPL-MCNC: 90 MG/DL (ref 74–99)
HCT VFR BLD AUTO: 44.2 % (ref 37–54)
HDLC SERPL-MCNC: 64 MG/DL
HGB BLD-MCNC: 14.6 G/DL (ref 12.5–16.5)
IMM GRANULOCYTES # BLD AUTO: <0.03 K/UL (ref 0–0.58)
IMM GRANULOCYTES NFR BLD: 0 % (ref 0–5)
LDLC SERPL CALC-MCNC: 103 MG/DL
LYMPHOCYTES NFR BLD: 1.09 K/UL (ref 1.5–4)
LYMPHOCYTES RELATIVE PERCENT: 26 % (ref 20–42)
MCH RBC QN AUTO: 29.7 PG (ref 26–35)
MCHC RBC AUTO-ENTMCNC: 33 G/DL (ref 32–34.5)
MCV RBC AUTO: 90 FL (ref 80–99.9)
MONOCYTES NFR BLD: 0.27 K/UL (ref 0.1–0.95)
MONOCYTES NFR BLD: 6 % (ref 2–12)
NEUTROPHILS NFR BLD: 65 % (ref 43–80)
NEUTS SEG NFR BLD: 2.77 K/UL (ref 1.8–7.3)
PLATELET, FLUORESCENCE: 127 K/UL (ref 130–450)
PMV BLD AUTO: 10.8 FL (ref 7–12)
POTASSIUM SERPL-SCNC: 4.5 MMOL/L (ref 3.5–5)
PROT SERPL-MCNC: 6.5 G/DL (ref 6.4–8.3)
RBC # BLD AUTO: 4.91 M/UL (ref 3.8–5.8)
SODIUM SERPL-SCNC: 138 MMOL/L (ref 132–146)
TRIGL SERPL-MCNC: 72 MG/DL
TSH SERPL DL<=0.05 MIU/L-ACNC: 1.32 UIU/ML (ref 0.27–4.2)
VLDLC SERPL CALC-MCNC: 14 MG/DL
WBC OTHER # BLD: 4.3 K/UL (ref 4.5–11.5)

## 2025-05-06 PROCEDURE — 80061 LIPID PANEL: CPT

## 2025-05-06 PROCEDURE — 80053 COMPREHEN METABOLIC PANEL: CPT

## 2025-05-06 PROCEDURE — 82306 VITAMIN D 25 HYDROXY: CPT

## 2025-05-06 PROCEDURE — 84443 ASSAY THYROID STIM HORMONE: CPT

## 2025-05-06 PROCEDURE — 36415 COLL VENOUS BLD VENIPUNCTURE: CPT

## 2025-05-06 PROCEDURE — 85025 COMPLETE CBC W/AUTO DIFF WBC: CPT

## 2025-05-17 ENCOUNTER — RESULTS FOLLOW-UP (OUTPATIENT)
Dept: PRIMARY CARE CLINIC | Age: 45
End: 2025-05-17

## 2025-07-31 DIAGNOSIS — M17.0 BILATERAL PRIMARY OSTEOARTHRITIS OF KNEE: ICD-10-CM

## 2025-07-31 RX ORDER — CELECOXIB 200 MG/1
200 CAPSULE ORAL DAILY
Qty: 30 CAPSULE | Refills: 3 | Status: SHIPPED | OUTPATIENT
Start: 2025-07-31

## 2025-07-31 NOTE — TELEPHONE ENCOUNTER
Name of Medication(s) Requested:  Requested Prescriptions     Pending Prescriptions Disp Refills    celecoxib (CELEBREX) 200 MG capsule 30 capsule 3     Sig: Take 1 capsule by mouth daily       Medication is on current medication list Yes    Dosage and directions were verified? Yes    Quantity verified: 30 day supply     Pharmacy Verified?  Yes    Last Appointment:  4/29/2025    Future appts:  Future Appointments   Date Time Provider Department Center   4/30/2026  1:30 PM Tye Mojica DO [facility] DEP        (If no appt send self scheduling link. .REFILLAPPT)  Scheduling request sent?     [] Yes  [x] No    Does patient need updated?  [] Yes  [x] No